# Patient Record
Sex: MALE | Race: WHITE | HISPANIC OR LATINO | Employment: FULL TIME | URBAN - METROPOLITAN AREA
[De-identification: names, ages, dates, MRNs, and addresses within clinical notes are randomized per-mention and may not be internally consistent; named-entity substitution may affect disease eponyms.]

---

## 2018-02-23 ENCOUNTER — OFFICE VISIT (OUTPATIENT)
Dept: MEDICAL GROUP | Facility: MEDICAL CENTER | Age: 38
End: 2018-02-23
Payer: COMMERCIAL

## 2018-02-23 ENCOUNTER — HOSPITAL ENCOUNTER (OUTPATIENT)
Dept: LAB | Facility: MEDICAL CENTER | Age: 38
End: 2018-02-23
Attending: NURSE PRACTITIONER
Payer: COMMERCIAL

## 2018-02-23 VITALS
DIASTOLIC BLOOD PRESSURE: 80 MMHG | TEMPERATURE: 98.9 F | BODY MASS INDEX: 36.88 KG/M2 | OXYGEN SATURATION: 94 % | HEART RATE: 77 BPM | HEIGHT: 69 IN | SYSTOLIC BLOOD PRESSURE: 110 MMHG | WEIGHT: 249 LBS

## 2018-02-23 DIAGNOSIS — F33.1 MODERATE EPISODE OF RECURRENT MAJOR DEPRESSIVE DISORDER (HCC): ICD-10-CM

## 2018-02-23 DIAGNOSIS — Z00.00 ANNUAL PHYSICAL EXAM: ICD-10-CM

## 2018-02-23 DIAGNOSIS — Z91.09 ENVIRONMENTAL ALLERGIES: ICD-10-CM

## 2018-02-23 DIAGNOSIS — E66.9 OBESITY (BMI 30-39.9): ICD-10-CM

## 2018-02-23 LAB
25(OH)D3 SERPL-MCNC: 12 NG/ML (ref 30–100)
ALBUMIN SERPL BCP-MCNC: 4.5 G/DL (ref 3.2–4.9)
ALBUMIN/GLOB SERPL: 1.2 G/DL
ALP SERPL-CCNC: 71 U/L (ref 30–99)
ALT SERPL-CCNC: 48 U/L (ref 2–50)
ANION GAP SERPL CALC-SCNC: 6 MMOL/L (ref 0–11.9)
AST SERPL-CCNC: 30 U/L (ref 12–45)
BASOPHILS # BLD AUTO: 0.6 % (ref 0–1.8)
BASOPHILS # BLD: 0.06 K/UL (ref 0–0.12)
BILIRUB SERPL-MCNC: 0.6 MG/DL (ref 0.1–1.5)
BUN SERPL-MCNC: 13 MG/DL (ref 8–22)
CALCIUM SERPL-MCNC: 9.1 MG/DL (ref 8.5–10.5)
CHLORIDE SERPL-SCNC: 103 MMOL/L (ref 96–112)
CHOLEST SERPL-MCNC: 142 MG/DL (ref 100–199)
CO2 SERPL-SCNC: 28 MMOL/L (ref 20–33)
CREAT SERPL-MCNC: 0.75 MG/DL (ref 0.5–1.4)
EOSINOPHIL # BLD AUTO: 0.14 K/UL (ref 0–0.51)
EOSINOPHIL NFR BLD: 1.4 % (ref 0–6.9)
ERYTHROCYTE [DISTWIDTH] IN BLOOD BY AUTOMATED COUNT: 38.5 FL (ref 35.9–50)
EST. AVERAGE GLUCOSE BLD GHB EST-MCNC: 108 MG/DL
GLOBULIN SER CALC-MCNC: 3.7 G/DL (ref 1.9–3.5)
GLUCOSE SERPL-MCNC: 84 MG/DL (ref 65–99)
HBA1C MFR BLD: 5.4 % (ref 0–5.6)
HCT VFR BLD AUTO: 43 % (ref 42–52)
HDLC SERPL-MCNC: 41 MG/DL
HGB BLD-MCNC: 14.6 G/DL (ref 14–18)
IMM GRANULOCYTES # BLD AUTO: 0.03 K/UL (ref 0–0.11)
IMM GRANULOCYTES NFR BLD AUTO: 0.3 % (ref 0–0.9)
LDLC SERPL CALC-MCNC: 89 MG/DL
LYMPHOCYTES # BLD AUTO: 2.11 K/UL (ref 1–4.8)
LYMPHOCYTES NFR BLD: 20.9 % (ref 22–41)
MCH RBC QN AUTO: 29.9 PG (ref 27–33)
MCHC RBC AUTO-ENTMCNC: 34 G/DL (ref 33.7–35.3)
MCV RBC AUTO: 88.1 FL (ref 81.4–97.8)
MONOCYTES # BLD AUTO: 0.5 K/UL (ref 0–0.85)
MONOCYTES NFR BLD AUTO: 5 % (ref 0–13.4)
NEUTROPHILS # BLD AUTO: 7.26 K/UL (ref 1.82–7.42)
NEUTROPHILS NFR BLD: 71.8 % (ref 44–72)
NRBC # BLD AUTO: 0 K/UL
NRBC BLD-RTO: 0 /100 WBC
PLATELET # BLD AUTO: 320 K/UL (ref 164–446)
PMV BLD AUTO: 9.9 FL (ref 9–12.9)
POTASSIUM SERPL-SCNC: 3.7 MMOL/L (ref 3.6–5.5)
PROT SERPL-MCNC: 8.2 G/DL (ref 6–8.2)
RBC # BLD AUTO: 4.88 M/UL (ref 4.7–6.1)
SODIUM SERPL-SCNC: 137 MMOL/L (ref 135–145)
TRIGL SERPL-MCNC: 58 MG/DL (ref 0–149)
TSH SERPL DL<=0.005 MIU/L-ACNC: 1.6 UIU/ML (ref 0.38–5.33)
WBC # BLD AUTO: 10.1 K/UL (ref 4.8–10.8)

## 2018-02-23 PROCEDURE — 80053 COMPREHEN METABOLIC PANEL: CPT

## 2018-02-23 PROCEDURE — 85025 COMPLETE CBC W/AUTO DIFF WBC: CPT

## 2018-02-23 PROCEDURE — 83036 HEMOGLOBIN GLYCOSYLATED A1C: CPT

## 2018-02-23 PROCEDURE — 36415 COLL VENOUS BLD VENIPUNCTURE: CPT

## 2018-02-23 PROCEDURE — 99204 OFFICE O/P NEW MOD 45 MIN: CPT | Performed by: NURSE PRACTITIONER

## 2018-02-23 PROCEDURE — 82306 VITAMIN D 25 HYDROXY: CPT

## 2018-02-23 PROCEDURE — 84443 ASSAY THYROID STIM HORMONE: CPT

## 2018-02-23 PROCEDURE — 80061 LIPID PANEL: CPT

## 2018-02-23 ASSESSMENT — PATIENT HEALTH QUESTIONNAIRE - PHQ9
SUM OF ALL RESPONSES TO PHQ QUESTIONS 1-9: 5
CLINICAL INTERPRETATION OF PHQ2 SCORE: 2
5. POOR APPETITE OR OVEREATING: 1 - SEVERAL DAYS

## 2018-02-23 NOTE — ASSESSMENT & PLAN NOTE
Patient reports having moderate amounts of stress at home due to his family living situation. He has had some depression in the past, never treated for it. He states it comes and goes depending on stress. He denies any suicidal ideation at this time.

## 2018-02-23 NOTE — PROGRESS NOTES
"Osvaldo Shaikh is a 37 y.o. male here to establish care and discuss the following:    HPI:    Moderate episode of recurrent major depressive disorder (CMS-[facility])  Patient reports having moderate amounts of stress at home due to his family living situation. He has had some depression in the past, never treated for it. He states it comes and goes depending on stress. He denies any suicidal ideation at this time.    Environmental allergies  Patient has had environmental allergies, worse in the spring and fall. He complains of itchy watery eyes, itchy nose, and sneezing attacks. He has used nasal sprays in the past without benefit as they dry his nose out. He has not used OTC allergy medications.    Current medicines (including changes today)  No current outpatient prescriptions on file.     No current facility-administered medications for this visit.      He  has a past medical history of Allergy; Anxiety; Chronic back pain; and Depression.  He  has no past surgical history on file.  Social History   Substance Use Topics   • Smoking status: Never Smoker   • Smokeless tobacco: Never Used   • Alcohol use Yes      Comment: every other friday     Social History     Social History Narrative   • No narrative on file     Family History   Problem Relation Age of Onset   • Family history unknown: Yes     No family status information on file.         ROS  No chest pain, no abdominal pain, no rash.  Positive ROS as per HPI.  All other systems reviewed and are negative      Objective:     Blood pressure 110/80, pulse 77, temperature 37.2 °C (98.9 °F), height 1.753 m (5' 9\"), weight 112.9 kg (249 lb), SpO2 94 %. Body mass index is 36.77 kg/m².     Physical Exam:    Constitutional: Alert, no distress.  Skin: Warm, dry, good turgor, no rashes in visible areas.  Eye: Equal, round and reactive, conjunctiva clear, lids normal.  ENMT: Lips without lesions, good dentition, oropharynx clear.  Neck: Trachea midline, no masses, no thyromegaly. " No cervical or supraclavicular lymphadenopathy.  Respiratory: Unlabored respiratory effort, lungs clear to auscultation, no wheezes, no ronchi.  Cardiovascular: Normal S1, S2, no murmur, no edema.  Abdomen: Soft, non-tender, no masses, no hepatosplenomegaly.  Psych: Alert and oriented x3, normal affect and mood.        Assessment and Plan:   The following treatment plan was discussed    1. Annual physical exam  Check labs, follow-up for annual.  - CBC WITH DIFFERENTIAL; Future  - COMP METABOLIC PANEL; Future  - HEMOGLOBIN A1C; Future  - LIPID PROFILE; Future  - TSH WITH REFLEX TO FT4; Future  - VITAMIN D,25 HYDROXY; Future    2. Moderate episode of recurrent major depressive disorder (CMS-HCC)  Unstable.  Advised patient to consider a family counselor for his relationship and family stressors.    3. Environmental allergies  Unstable.  Advised patient to use OTC Claritin or Zyrtec as needed for allergy symptoms.    4. Obesity (BMI 30-39.9)  Check labs.  Patient and I discussed the importance of lifestyle changes, with particular emphasis on decreasing sugar and carbohydrate intake and increasing plant-based nutrition (for the purposes of weight loss, general health, and prevention of chronic illnesses), as well as regular cardiovascular exercise, proper sleep, and stress management. Patient verbalized understanding.    - Patient identified as having weight management issue.  Appropriate orders and counseling given.        Followup: Return in about 2 weeks (around 3/9/2018) for Annual.    I have placed the below orders and discussed them with an approved delegating provider. The MA is performing the below orders under the direction of Dr. Mckeon

## 2018-02-23 NOTE — ASSESSMENT & PLAN NOTE
Patient has had environmental allergies, worse in the spring and fall. He complains of itchy watery eyes, itchy nose, and sneezing attacks. He has used nasal sprays in the past without benefit as they dry his nose out. He has not used OTC allergy medications.

## 2018-03-23 ENCOUNTER — OFFICE VISIT (OUTPATIENT)
Dept: MEDICAL GROUP | Facility: MEDICAL CENTER | Age: 38
End: 2018-03-23
Payer: COMMERCIAL

## 2018-03-23 VITALS
BODY MASS INDEX: 36.88 KG/M2 | HEIGHT: 69 IN | OXYGEN SATURATION: 96 % | TEMPERATURE: 98.5 F | HEART RATE: 76 BPM | DIASTOLIC BLOOD PRESSURE: 84 MMHG | RESPIRATION RATE: 16 BRPM | WEIGHT: 249 LBS | SYSTOLIC BLOOD PRESSURE: 126 MMHG

## 2018-03-23 DIAGNOSIS — M54.50 ACUTE MIDLINE LOW BACK PAIN WITHOUT SCIATICA: ICD-10-CM

## 2018-03-23 DIAGNOSIS — M25.511 ACUTE PAIN OF RIGHT SHOULDER: ICD-10-CM

## 2018-03-23 PROCEDURE — 99214 OFFICE O/P EST MOD 30 MIN: CPT | Performed by: NURSE PRACTITIONER

## 2018-03-23 RX ORDER — NAPROXEN 500 MG/1
500 TABLET ORAL 2 TIMES DAILY WITH MEALS
Qty: 28 TAB | Refills: 0 | Status: SHIPPED | OUTPATIENT
Start: 2018-03-23 | End: 2018-04-06

## 2018-03-23 NOTE — ASSESSMENT & PLAN NOTE
Patient started having midline low back pain over the last few weeks. He loads and unloads luggage at work. He does not recall a specific event that caused the pain to start. He denies any pain radiating down his legs. He is still able to walk normally, describes as a dull ache. Denies bowel or bladder changes or extremity numbness or weakness. He feels his low back pain may be related to his increased weight.

## 2018-03-23 NOTE — PROGRESS NOTES
"Subjective:   Osvaldo Shaikh is a 37 y.o. male here today for low back pain and right shoulder pain.    Acute midline low back pain without sciatica  Patient started having midline low back pain over the last few weeks. He loads and unloads luggage at work. He does not recall a specific event that caused the pain to start. He denies any pain radiating down his legs. He is still able to walk normally, describes as a dull ache. Denies bowel or bladder changes or extremity numbness or weakness. He feels his low back pain may be related to his increased weight.    Acute pain of right shoulder  Previously injured his shoulder at work several years ago. Did physical therapy for this and is significantly improved. He recently noticed that it has started hurting and catching again. He has not taken any medications for this.       Current medicines (including changes today)  Current Outpatient Prescriptions   Medication Sig Dispense Refill   • naproxen (NAPROSYN) 500 MG Tab Take 1 Tab by mouth 2 times a day, with meals for 14 days. 28 Tab 0     No current facility-administered medications for this visit.      He  has a past medical history of Allergy; Anxiety; Chronic back pain; and Depression.    ROS  No chest pain, no shortness of breath, no abdominal pain  Positive ROS as per HPI.  All other systems reviewed and are negative.     Objective:     Blood pressure 126/84, pulse 76, temperature 36.9 °C (98.5 °F), resp. rate 16, height 1.753 m (5' 9.02\"), weight 112.9 kg (249 lb), SpO2 96 %. Body mass index is 36.75 kg/m².     Physical Exam:  Constitutional: Alert, no distress.  Skin: Warm, dry, good turgor, no rashes in visible areas.  Eye: Equal, round and reactive, conjunctiva clear, lids normal.  ENMT: Lips without lesions, good dentition, oropharynx clear.  Neck: Trachea midline, no masses, no thyromegaly. No cervical or supraclavicular lymphadenopathy  Respiratory: Unlabored respiratory effort, lungs clear to auscultation, " no wheezes, no ronchi.  Cardiovascular: Normal S1, S2, no murmur, no edema.  Abdomen: Soft, non-tender, no masses, no hepatosplenomegaly.  Psych: Alert and oriented x3, normal affect and mood.  MSK: Tenderness to palpation of midline low back, no SI tenderness. Pain with full range of motion of her shoulder. Range of motion intact. Normal gait.    Assessment and Plan:   The following treatment plan was discussed    1. Acute midline low back pain without sciatica  Unstable.  Trial of naproxen 500 mg twice daily for 2 weeks.  Exercises and stretches given for back pain.  Advised weight loss, increasing exercise, and healthy diet.  - naproxen (NAPROSYN) 500 MG Tab; Take 1 Tab by mouth 2 times a day, with meals for 14 days.  Dispense: 28 Tab; Refill: 0    2. Acute pain of right shoulder  Unstable.  Trial of naproxen 500 mg twice daily for 2 weeks.  Advised light stretching.  - naproxen (NAPROSYN) 500 MG Tab; Take 1 Tab by mouth 2 times a day, with meals for 14 days.  Dispense: 28 Tab; Refill: 0      Followup: Return if symptoms worsen or fail to improve.    I have placed the below orders and discussed them with an approved delegating provider. The MA is performing the below orders under the direction of Dr. Mckeon

## 2018-03-23 NOTE — ASSESSMENT & PLAN NOTE
Previously injured his shoulder at work several years ago. Did physical therapy for this and is significantly improved. He recently noticed that it has started hurting and catching again. He has not taken any medications for this.

## 2018-10-18 ENCOUNTER — TELEPHONE (OUTPATIENT)
Dept: MEDICAL GROUP | Facility: PHYSICIAN GROUP | Age: 38
End: 2018-10-18

## 2018-10-19 ENCOUNTER — OFFICE VISIT (OUTPATIENT)
Dept: MEDICAL GROUP | Facility: PHYSICIAN GROUP | Age: 38
End: 2018-10-19
Payer: COMMERCIAL

## 2018-10-19 ENCOUNTER — APPOINTMENT (OUTPATIENT)
Dept: RADIOLOGY | Facility: IMAGING CENTER | Age: 38
End: 2018-10-19
Attending: INTERNAL MEDICINE
Payer: COMMERCIAL

## 2018-10-19 VITALS
DIASTOLIC BLOOD PRESSURE: 86 MMHG | TEMPERATURE: 98.9 F | BODY MASS INDEX: 37.77 KG/M2 | HEART RATE: 110 BPM | OXYGEN SATURATION: 96 % | SYSTOLIC BLOOD PRESSURE: 138 MMHG | HEIGHT: 69 IN | WEIGHT: 255 LBS

## 2018-10-19 DIAGNOSIS — G89.29 CHRONIC MIDLINE LOW BACK PAIN WITHOUT SCIATICA: ICD-10-CM

## 2018-10-19 DIAGNOSIS — E66.9 OBESITY (BMI 30-39.9): ICD-10-CM

## 2018-10-19 DIAGNOSIS — M54.50 CHRONIC MIDLINE LOW BACK PAIN WITHOUT SCIATICA: ICD-10-CM

## 2018-10-19 DIAGNOSIS — E55.9 VITAMIN D DEFICIENCY: ICD-10-CM

## 2018-10-19 DIAGNOSIS — E66.9 CLASS 2 OBESITY WITH BODY MASS INDEX (BMI) OF 37.0 TO 37.9 IN ADULT, UNSPECIFIED OBESITY TYPE, UNSPECIFIED WHETHER SERIOUS COMORBIDITY PRESENT: ICD-10-CM

## 2018-10-19 DIAGNOSIS — J06.9 VIRAL URI: ICD-10-CM

## 2018-10-19 PROBLEM — M25.511 ACUTE PAIN OF RIGHT SHOULDER: Status: RESOLVED | Noted: 2018-03-23 | Resolved: 2018-10-19

## 2018-10-19 PROBLEM — Z00.00 ANNUAL PHYSICAL EXAM: Status: RESOLVED | Noted: 2018-02-23 | Resolved: 2018-10-19

## 2018-10-19 LAB
INT CON NEG: NORMAL
INT CON POS: NORMAL
S PYO AG THROAT QL: NORMAL

## 2018-10-19 PROCEDURE — 72100 X-RAY EXAM L-S SPINE 2/3 VWS: CPT | Mod: TC | Performed by: INTERNAL MEDICINE

## 2018-10-19 PROCEDURE — 87880 STREP A ASSAY W/OPTIC: CPT | Performed by: INTERNAL MEDICINE

## 2018-10-19 PROCEDURE — 99204 OFFICE O/P NEW MOD 45 MIN: CPT | Performed by: INTERNAL MEDICINE

## 2018-10-19 RX ORDER — ERGOCALCIFEROL 1.25 MG/1
50000 CAPSULE ORAL
Qty: 12 CAP | Refills: 3 | Status: SHIPPED | OUTPATIENT
Start: 2018-10-19 | End: 2019-07-05

## 2018-10-19 RX ORDER — NAPROXEN 500 MG/1
500 TABLET ORAL 2 TIMES DAILY WITH MEALS
Qty: 30 TAB | Refills: 1 | Status: SHIPPED | OUTPATIENT
Start: 2018-10-19 | End: 2019-07-05

## 2018-10-19 NOTE — ASSESSMENT & PLAN NOTE
This is a chronic health problem that is uncontrolled with current medications and lifestyle measures. Initially started few years back with pulling heavy weight with right arm pain which resolved but now has only back pain. Currently 7/10 severity.

## 2018-10-19 NOTE — ASSESSMENT & PLAN NOTE
This is a new problem started two days back , has sick contacts at home. Endorses generalized bodycahes, no fever or chills. OTC medication Theraflu max and Tylenol which didn't help.

## 2018-10-19 NOTE — PATIENT INSTRUCTIONS
"Upper Respiratory Infection, Adult  Most upper respiratory infections (URIs) are a viral infection of the air passages leading to the lungs. A URI affects the nose, throat, and upper air passages. The most common type of URI is nasopharyngitis and is typically referred to as \"the common cold.\"  URIs run their course and usually go away on their own. Most of the time, a URI does not require medical attention, but sometimes a bacterial infection in the upper airways can follow a viral infection. This is called a secondary infection. Sinus and middle ear infections are common types of secondary upper respiratory infections.  Bacterial pneumonia can also complicate a URI. A URI can worsen asthma and chronic obstructive pulmonary disease (COPD). Sometimes, these complications can require emergency medical care and may be life threatening.  What are the causes?  Almost all URIs are caused by viruses. A virus is a type of germ and can spread from one person to another.  What increases the risk?  You may be at risk for a URI if:  · You smoke.  · You have chronic heart or lung disease.  · You have a weakened defense (immune) system.  · You are very young or very old.  · You have nasal allergies or asthma.  · You work in crowded or poorly ventilated areas.  · You work in health care facilities or schools.  What are the signs or symptoms?  Symptoms typically develop 2-3 days after you come in contact with a cold virus. Most viral URIs last 7-10 days. However, viral URIs from the influenza virus (flu virus) can last 14-18 days and are typically more severe. Symptoms may include:  · Runny or stuffy (congested) nose.  · Sneezing.  · Cough.  · Sore throat.  · Headache.  · Fatigue.  · Fever.  · Loss of appetite.  · Pain in your forehead, behind your eyes, and over your cheekbones (sinus pain).  · Muscle aches.  How is this diagnosed?  Your health care provider may diagnose a URI by:  · Physical exam.  · Tests to check that your " symptoms are not due to another condition such as:  ¨ Strep throat.  ¨ Sinusitis.  ¨ Pneumonia.  ¨ Asthma.  How is this treated?  A URI goes away on its own with time. It cannot be cured with medicines, but medicines may be prescribed or recommended to relieve symptoms. Medicines may help:  · Reduce your fever.  · Reduce your cough.  · Relieve nasal congestion.  Follow these instructions at home:  · Take medicines only as directed by your health care provider.  · Gargle warm saltwater or take cough drops to comfort your throat as directed by your health care provider.  · Use a warm mist humidifier or inhale steam from a shower to increase air moisture. This may make it easier to breathe.  · Drink enough fluid to keep your urine clear or pale yellow.  · Eat soups and other clear broths and maintain good nutrition.  · Rest as needed.  · Return to work when your temperature has returned to normal or as your health care provider advises. You may need to stay home longer to avoid infecting others. You can also use a face mask and careful hand washing to prevent spread of the virus.  · Increase the usage of your inhaler if you have asthma.  · Do not use any tobacco products, including cigarettes, chewing tobacco, or electronic cigarettes. If you need help quitting, ask your health care provider.  How is this prevented?  The best way to protect yourself from getting a cold is to practice good hygiene.  · Avoid oral or hand contact with people with cold symptoms.  · Wash your hands often if contact occurs.  There is no clear evidence that vitamin C, vitamin E, echinacea, or exercise reduces the chance of developing a cold. However, it is always recommended to get plenty of rest, exercise, and practice good nutrition.  Contact a health care provider if:  · You are getting worse rather than better.  · Your symptoms are not controlled by medicine.  · You have chills.  · You have worsening shortness of breath.  · You have brown  or red mucus.  · You have yellow or brown nasal discharge.  · You have pain in your face, especially when you bend forward.  · You have a fever.  · You have swollen neck glands.  · You have pain while swallowing.  · You have white areas in the back of your throat.  Get help right away if:  · You have severe or persistent:  ¨ Headache.  ¨ Ear pain.  ¨ Sinus pain.  ¨ Chest pain.  · You have chronic lung disease and any of the following:  ¨ Wheezing.  ¨ Prolonged cough.  ¨ Coughing up blood.  ¨ A change in your usual mucus.  · You have a stiff neck.  · You have changes in your:  ¨ Vision.  ¨ Hearing.  ¨ Thinking.  ¨ Mood.  This information is not intended to replace advice given to you by your health care provider. Make sure you discuss any questions you have with your health care provider.  Document Released: 06/13/2002 Document Revised: 08/20/2017 Document Reviewed: 03/25/2015  [employer] Patient Education © 2017 [employer]

## 2018-10-19 NOTE — PROGRESS NOTES
CC:  New patient, sore throat.    HISTORY OF THE PRESENT ILLNESS: Patient is a 37 y.o. male. This pleasant patient is here today to discuss her medical conditions as mentioned in history of present illness below.    Health Maintenance: Due for flu vaccine which she will take after the resolution of this acute episode.      Viral URI  This is a new problem started two days back , has sick contacts at home. Endorses generalized bodycahes, no fever or chills. OTC medication Theraflu max and Tylenol which didn't help.     Chronic midline low back pain without sciatica  This is a chronic health problem that is uncontrolled with current medications and lifestyle measures. Initially started few years back with pulling heavy weight with right arm pain which resolved but now has only back pain. Currently 7/10 severity.    Vitamin D deficiency  This is a chronic health problem that is uncontrolled with current medications and lifestyle measures. Last vitamin D check in several 2018 was showing low at 12, not on any medications.    PHQ score 0, BMI > 37 refusing health improvement program at this time, no tobacco, no fall injuries    Allergies: Patient has no known allergies.    Current Outpatient Prescriptions Ordered in [facility]   Medication Sig Dispense Refill   • vitamin D, Ergocalciferol, (DRISDOL) 10920 units Cap capsule Take 1 Cap by mouth every 7 days. 12 Cap 3   • naproxen (NAPROSYN) 500 MG Tab Take 1 Tab by mouth 2 times a day, with meals. 30 Tab 1     No current Epic-ordered facility-administered medications on file.        Past Medical History:   Diagnosis Date   • Allergy    • Anxiety    • Chronic back pain    • Depression        History reviewed. No pertinent surgical history.    Social History   Substance Use Topics   • Smoking status: Never Smoker   • Smokeless tobacco: Never Used   • Alcohol use Yes      Comment: every other friday       Social History     Social History Narrative   • No narrative on file  "      Family History   Problem Relation Age of Onset   • Family history unknown: Yes       ROS:     - Constitutional: Negative for fever, chills, unexpected weight change, and fatigue/generalized weakness.     - HEENT positive for rhinorrhea, sore throat: Negative for headaches, vision changes, hearing changes, ear pain, ear discharge, sinus congestion,and neck pain.      - Respiratory: Negative for cough, sputum production, chest congestion, dyspnea, wheezing, and crackles.      - Cardiovascular: Negative for chest pain, palpitations, orthopnea, and bilateral lower extremity edema.     - Gastrointestinal: Negative for heartburn, nausea, vomiting, abdominal pain, hematochezia, melena, diarrhea, constipation, and greasy/foul-smelling stools.     - Genitourinary: Negative for dysuria, polyuria, hematuria, pyuria, urinary urgency, and urinary incontinence.     - Musculoskeletal: Positive for low back pain Negative for myalgias and joint pain.     - Skin: Negative for rash, itching, cyanotic skin color change.     - Neurological: Negative for dizziness, tingling, tremors, focal sensory deficit, focal weakness and headaches.     - Endo/Heme/Allergies: Does not bruise/bleed easily.     - Psychiatric/Behavioral: Negative for depression, suicidal/homicidal ideation and memory loss.      Last lab work in February 2018 reviewed and discussed with the patient.    Exam: Blood pressure 138/86, pulse (!) 110, temperature 37.2 °C (98.9 °F), temperature source Temporal, height 1.753 m (5' 9.02\"), weight 115.7 kg (255 lb), SpO2 96 %. Body mass index is 37.64 kg/m².    General: Normal appearing. No distress.  HEENT: Normocephalic. Eyes conjunctiva clear lids without ptosis, pupils equal and reactive to light accommodation, ears normal shape and contour, canals are clear bilaterally, tympanic membranes are benign, nasal mucosa benign, oropharynx is without erythema, edema or exudates.   Neck: Supple without JVD or bruit. Thyroid is " not enlarged.  Pulmonary: Clear to ausculation.  Normal effort. No rales, ronchi, or wheezing.  Cardiovascular: Regular rate and rhythm without murmur. Carotid and radial pulses are intact and equal bilaterally.  Abdomen: Soft, nontender, nondistended. Normal bowel sounds. Liver and spleen are not palpable  Neurologic: Grossly nonfocal  Lymph: No cervical, supraclavicular or axillary lymph nodes are palpable  Skin: Warm and dry.  No obvious lesions.  Musculoskeletal: Normal gait. No extremity cyanosis, clubbing, or edema.  Psych: Normal mood and affect. Alert and oriented x3. Judgment and insight is normal.      Please note that this dictation was created using voice recognition software. I have made every reasonable attempt to correct obvious errors, but I expect that there are errors of grammar and possibly content that I did not discover before finalizing the note.      Assessment/Plan  1. Viral URI  New problem, we will do a rapid strep test . Per my clinical exam it looks like viral etiology, given instructions to take copious amounts of fluids and Tylenol for body pains and inform us if it does not come down in next 1 week.  - POCT Rapid Strep A    2. Chronic midline low back pain without sciatica  Uncontrolled, we will do an imaging on the lumbar spine, start on naproxen 500 mg twice a day when necessary for pain, referral to physical therapy given.  - DX-LUMBAR SPINE-2 OR 3 VIEWS; Future  - naproxen (NAPROSYN) 500 MG Tab; Take 1 Tab by mouth 2 times a day, with meals.  Dispense: 30 Tab; Refill: 1  - REFERRAL TO PHYSICAL THERAPY Reason for Therapy: Eval/Treat/Report    3. Obesity (BMI 30-39.9)  4. Class 2 obesity with body mass index (BMI) of 37.0 to 37.9 in adult, unspecified obesity type, unspecified whether serious comorbidity present  Pt educated on the increase of morbidity and mortality associated with excess weight including DM, Heart Disease, HTN, stroke, and sleep apnea.  Pt advised weight loss of 5%  through reduced calorie, low fat diet and 150 mins of exercise a week  Recommend obesity clinic if patient is unsuccessful with weight loss    5. Vitamin D deficiency  Uncontrolled, last checked in February 2018 but not on any medication. We will give him high-dose vitamin D 50,000 units every weekly.  - vitamin D, Ergocalciferol, (DRISDOL) 77970 units Cap capsule; Take 1 Cap by mouth every 7 days.  Dispense: 12 Cap; Refill: 3

## 2018-12-13 ENCOUNTER — TELEPHONE (OUTPATIENT)
Dept: MEDICAL GROUP | Facility: PHYSICIAN GROUP | Age: 38
End: 2018-12-13

## 2018-12-13 NOTE — TELEPHONE ENCOUNTER
Future Appointments       Provider Department Center    12/14/2018 11:00 AM Geetha Velasquez M.D. [facility]        ESTABLISHED PATIENT PRE-VISIT PLANNING     Patient was contacted to complete PVP.     Note: Patient will not be contacted if there is no indication to call.     1.  Reviewed notes from the last few office visits within the medical group: Yes    2.  If any orders were placed at last visit or intended to be done for this visit (i.e. 6 mos follow-up), do we have Results/Consult Notes?        •  Labs - Labs were not ordered at last office visit.       •  Imaging - Imaging was not ordered at last office visit.       •  Referrals - Referral ordered, patient has NOT been seen. Pt was a no show to the physical therapy referral     3. Is this appointment scheduled as a Hospital Follow-Up? No    4.  Immunizations were updated in Epic using WebIZ?: No WebIZ record       •  Web Iz Recommendations: FLU and TDAP    5.  Patient is due for the following Health Maintenance Topics:   Health Maintenance Due   Topic Date Due   • IMM DTaP/Tdap/Td Vaccine (1 - Tdap) 12/16/1999   • IMM INFLUENZA (1) 09/01/2018       6.  MDX printed for Provider? NO    7.  Patient was informed to arrive 15 min prior to their scheduled appointment and bring in their medication bottles.

## 2018-12-14 ENCOUNTER — APPOINTMENT (OUTPATIENT)
Dept: MEDICAL GROUP | Facility: PHYSICIAN GROUP | Age: 38
End: 2018-12-14
Payer: COMMERCIAL

## 2019-02-08 ENCOUNTER — OFFICE VISIT (OUTPATIENT)
Dept: MEDICAL GROUP | Facility: PHYSICIAN GROUP | Age: 39
End: 2019-02-08
Payer: COMMERCIAL

## 2019-02-08 VITALS
SYSTOLIC BLOOD PRESSURE: 128 MMHG | WEIGHT: 260 LBS | HEART RATE: 85 BPM | DIASTOLIC BLOOD PRESSURE: 70 MMHG | TEMPERATURE: 97.4 F | HEIGHT: 69 IN | BODY MASS INDEX: 38.51 KG/M2

## 2019-02-08 DIAGNOSIS — E55.9 VITAMIN D DEFICIENCY: ICD-10-CM

## 2019-02-08 DIAGNOSIS — E66.9 OBESITY (BMI 30-39.9): ICD-10-CM

## 2019-02-08 DIAGNOSIS — E66.9 CLASS 2 OBESITY WITH BODY MASS INDEX (BMI) OF 38.0 TO 38.9 IN ADULT, UNSPECIFIED OBESITY TYPE, UNSPECIFIED WHETHER SERIOUS COMORBIDITY PRESENT: ICD-10-CM

## 2019-02-08 DIAGNOSIS — Z23 NEED FOR VACCINATION: ICD-10-CM

## 2019-02-08 DIAGNOSIS — M54.50 CHRONIC MIDLINE LOW BACK PAIN WITHOUT SCIATICA: ICD-10-CM

## 2019-02-08 DIAGNOSIS — F33.1 MODERATE EPISODE OF RECURRENT MAJOR DEPRESSIVE DISORDER (HCC): ICD-10-CM

## 2019-02-08 DIAGNOSIS — G89.29 CHRONIC MIDLINE LOW BACK PAIN WITHOUT SCIATICA: ICD-10-CM

## 2019-02-08 PROCEDURE — 90471 IMMUNIZATION ADMIN: CPT | Performed by: INTERNAL MEDICINE

## 2019-02-08 PROCEDURE — 90472 IMMUNIZATION ADMIN EACH ADD: CPT | Performed by: INTERNAL MEDICINE

## 2019-02-08 PROCEDURE — 90715 TDAP VACCINE 7 YRS/> IM: CPT | Performed by: INTERNAL MEDICINE

## 2019-02-08 PROCEDURE — 99214 OFFICE O/P EST MOD 30 MIN: CPT | Mod: 25 | Performed by: INTERNAL MEDICINE

## 2019-02-08 PROCEDURE — 90686 IIV4 VACC NO PRSV 0.5 ML IM: CPT | Performed by: INTERNAL MEDICINE

## 2019-02-08 RX ORDER — LIDOCAINE 4 G/G
1 PATCH TOPICAL
Qty: 10 PATCH | Refills: 2 | Status: SHIPPED | OUTPATIENT
Start: 2019-02-08 | End: 2019-07-05

## 2019-02-08 RX ORDER — TIZANIDINE 4 MG/1
4 TABLET ORAL 3 TIMES DAILY PRN
Qty: 30 TAB | Refills: 1 | Status: SHIPPED | OUTPATIENT
Start: 2019-02-08 | End: 2019-07-05

## 2019-02-08 NOTE — ASSESSMENT & PLAN NOTE
This is a chronic health problem that is well controlled with current medications and lifestyle measures.  Patient refuses to say that he has depression at all accepts work-related stress.  Not on any medications. On Lifestyle modification.

## 2019-02-08 NOTE — PROGRESS NOTES
CC: Follow-up visit, back pain.    HISTORY OF PRESENT ILLNESS: Patient is a 38 y.o. male established patient who presents today to discuss on medical conditions as mentioned in HPI below.    Health Maintenance: Due for flu vaccine and tetanus vaccine which is okay to get in the office today.    Chronic midline low back pain without sciatica  This is a chronic health problem that is uncontrolled with current medications and lifestyle measures.Patient took Naproxen but did not do the PT referral given last time.Pt doesn't remember where his previous MRI was done. It was normal at that ttime as per the patient.    Obesity (BMI 30-39.9)  This is a chronic health problem that is uncontrolled with current medications and lifestyle measures.  Patient requesting for help as he is gaining weight, discussed at length on the [facility] improvement program which he was agreeable.    Moderate episode of recurrent major depressive disorder (CMS-HCC) (HCC)  This is a chronic health problem that is well controlled with current medications and lifestyle measures.  Patient refuses to say that he has depression at all accepts work-related stress.  Not on any medications. On Lifestyle modification.      PHQ score 0, BMI > 38 , no tobacco, no fall injuries.    Patient Active Problem List    Diagnosis Date Noted   • Viral URI 10/19/2018   • Chronic midline low back pain without sciatica 10/19/2018   • Vitamin D deficiency 10/19/2018   • Moderate episode of recurrent major depressive disorder (HCC) 02/23/2018   • Environmental allergies 02/23/2018   • Obesity (BMI 30-39.9) 02/23/2018      Allergies:Patient has no known allergies.    Current Outpatient Prescriptions   Medication Sig Dispense Refill   • tizanidine (ZANAFLEX) 4 MG Tab Take 1 Tab by mouth 3 times a day as needed. 30 Tab 1   • Lidocaine 4 % Patch 1 Patch by Apply externally route 1 time daily as needed. 10 Patch 2   • vitamin D, Ergocalciferol, (DRISDOL) 94674 units Cap  "capsule Take 1 Cap by mouth every 7 days. 12 Cap 3   • naproxen (NAPROSYN) 500 MG Tab Take 1 Tab by mouth 2 times a day, with meals. 30 Tab 1     No current facility-administered medications for this visit.        Social History   Substance Use Topics   • Smoking status: Never Smoker   • Smokeless tobacco: Never Used   • Alcohol use Yes      Comment: every other friday     Social History     Social History Narrative   • No narrative on file       Family History   Problem Relation Age of Onset   • Family history unknown: Yes        ROS:     - Constitutional:  Negative for fever, chills, unexpected weight change, and fatigue/generalized weakness.    - HEENT:  Negative for headaches, vision changes, hearing changes, ear pain, ear discharge, rhinorrhea, sinus congestion, sore throat, and neck pain.      - Respiratory: Negative for cough, sputum production, chest congestion, dyspnea, wheezing, and crackles.      - Cardiovascular: Negative for chest pain, palpitations, orthopnea, and bilateral lower extremity edema.     - Gastrointestinal: Negative for heartburn, nausea, vomiting, abdominal pain, hematochezia, melena, diarrhea, constipation, and greasy/foul-smelling stools.     - Genitourinary: Negative for dysuria, polyuria, hematuria, pyuria, urinary urgency, and urinary incontinence.     - Musculoskeletal: As mentioned in HPI above negative for myalgias and joint pain.     - Skin: Negative for rash, itching, cyanotic skin color change.     - Neurological: Negative for dizziness, tingling, tremors, focal sensory deficit, focal weakness and headaches.     - Endo/Heme/Allergies: Does not bruise/bleed easily.     - Psychiatric/Behavioral: Negative for depression, suicidal/homicidal ideation and memory loss.        Last lab work done in February 2018 reviewed and discussed with the patient.    Exam:    Blood pressure 128/70, pulse 85, temperature 36.3 °C (97.4 °F), temperature source Temporal, height 1.753 m (5' 9.02\"), " weight 117.9 kg (260 lb). Body mass index is 38.37 kg/m².    General:  Well nourished, well developed male in NAD  Head is grossly normal.  Neck: Supple without JVD or bruit. Thyroid is not enlarged.  Pulmonary: Clear to ausculation and percussion.  Normal effort. No rales, ronchi, or wheezing.  Cardiovascular: Regular rate and rhythm without murmur. Carotid and radial pulses are intact and equal bilaterally.  Extremities: no clubbing, cyanosis, or edema.  Back exam : Mild tenderness on palpation of lumbar spine, no paraspinal tenderness.  Straight leg raising test negative on both sides.    Please note that this dictation was created using voice recognition software. I have made every reasonable attempt to correct obvious errors, but I expect that there are errors of grammar and possibly content that I did not discover before finalizing the note.    Assessment/Plan:  1. Chronic midline low back pain without sciatica  Uncontrolled, patient has not kept up the physical therapy referral given in his last visit in October 2018.  Reviewed his lumbar spine x-ray which was done at that time within normal limits.  Patient does states that he got a previous MRI which was normal around 10 years back.  I do not suspect that he has any foraminal stenosis versus sciatica which was all negative on my physical exam as mentioned above.  Given instructions to keep up the physical therapy referral given so that I can plan on next steps which he understood and agreed.  Will give some more symptomatic treatment at this time including Zanaflex as needed and also lidocaine patches while at work.  - tizanidine (ZANAFLEX) 4 MG Tab; Take 1 Tab by mouth 3 times a day as needed.  Dispense: 30 Tab; Refill: 1  - Lidocaine 4 % Patch; 1 Patch by Apply externally route 1 time daily as needed.  Dispense: 10 Patch; Refill: 2    2. Obesity (BMI 30-39.9)  3. Class 2 obesity with body mass index (BMI) of 38.0 to 38.9 in adult, unspecified obesity  "type, unspecified whether serious comorbidity present  Pt educated on the increase of morbidity and mortality associated with excess weight including DM, Heart Disease, HTN, stroke, and sleep apnea.  Pt advised weight loss of 5% through reduced calorie, low fat diet and 150 mins of exercise a week  Recommend obesity clinic if patient is unsuccessful with weight loss  - REFERRAL TO [facility] anywayanyday IMPROVEMENT PROGRAMS (HIP) Services Requested: Physician Medical Weight Management Program; Reason for Referral? Waist Circumference>40\"; Reason for Visit: Overweight/Obesity  - TSH WITH REFLEX TO FT4; Future    4. Need for vaccination  - Tdap =>6yo IM  - Flu Quad Inj >3 Year Pre-Filled PF    5. Vitamin D deficiency  Last lab work included 2018 showing vitamin D deficiency, will recheck and replete if needed.  To continue current vitamin D 50,000 units q. weekly at this time.  - VITAMIN D,25 HYDROXY; Future      6. Moderate episode of recurrent major depressive disorder (HCC)  Stable, patient denies any depression when questioned to him.  To continue current lifestyle modification and emphasized on doing moderate exercise to come over this.  Will reevaluate again in his next visit.    "

## 2019-02-08 NOTE — ASSESSMENT & PLAN NOTE
This is a chronic health problem that is uncontrolled with current medications and lifestyle measures.Patient took Naproxen but did not do the PT referral given last time.Pt doesn't remember where his previous MRI was done. It was normal at that ttime as per the patient.

## 2019-02-08 NOTE — ASSESSMENT & PLAN NOTE
This is a chronic health problem that is uncontrolled with current medications and lifestyle measures.  Patient requesting for help as he is gaining weight, discussed at length on the [facility] improvement program which he was agreeable.

## 2019-02-26 DIAGNOSIS — M54.50 CHRONIC MIDLINE LOW BACK PAIN WITHOUT SCIATICA: ICD-10-CM

## 2019-02-26 DIAGNOSIS — G89.29 CHRONIC MIDLINE LOW BACK PAIN WITHOUT SCIATICA: ICD-10-CM

## 2019-03-29 ENCOUNTER — PHYSICAL THERAPY (OUTPATIENT)
Dept: PHYSICAL THERAPY | Facility: REHABILITATION | Age: 39
End: 2019-03-29
Attending: INTERNAL MEDICINE
Payer: COMMERCIAL

## 2019-03-29 DIAGNOSIS — G89.29 CHRONIC MIDLINE LOW BACK PAIN WITHOUT SCIATICA: ICD-10-CM

## 2019-03-29 DIAGNOSIS — M54.50 CHRONIC MIDLINE LOW BACK PAIN WITHOUT SCIATICA: ICD-10-CM

## 2019-03-29 PROCEDURE — 97014 ELECTRIC STIMULATION THERAPY: CPT

## 2019-03-29 PROCEDURE — 97110 THERAPEUTIC EXERCISES: CPT

## 2019-03-29 PROCEDURE — 97162 PT EVAL MOD COMPLEX 30 MIN: CPT

## 2019-03-29 ASSESSMENT — ENCOUNTER SYMPTOMS
PAIN SCALE AT HIGHEST: 8
PAIN SCALE AT LOWEST: 0
PAIN SCALE: 7

## 2019-03-29 NOTE — OP THERAPY EVALUATION
Outpatient Physical Therapy  INITIAL EVALUATION    [facility] [address]  [address]  [place] [address]  Phone:  [phone]  Fax:  [phone]    Date of Evaluation: 2019    Patient: Osvaldo Shaikh  YOB: 1980  MRN: [id number]     Referring Provider: Geetha Velasquez M.D.  [address] [place] [address] [address]  [place] [address]   Referring Diagnosis No admission diagnoses are documented for this encounter.     Time Calculation  Start time: 245  Stop time: 350 Time Calculation (min): 65 minutes     Physical Therapy Occurrence Codes    Date of onset of impairment:  19   Date physical therapy care plan established or reviewed:  3/29/19   Date physical therapy treatment started:  3/29/19          Chief Complaint: No chief complaint on file.    Visit Diagnoses     ICD-10-CM   1. 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 symptoms    Aggravating factors:  Wash dishes  Give 3 y.o. Son a bath  Sit > 30'         Past Medical History:   Diagnosis Date   • Allergy    • Anxiety    • Chronic back pain    • Depression      No past surgical history on file.    Precautions:       Objective   Observation and functional movement:  Decreased lordosis    Sensation and reflexes:     Not tested due to denial of l.e. Symptoms  Cough-- not worse    Palpation and joint mobility:     ttp p/a l2-5--noted paraspinal guarding            Therapeutic Treatments and Modalities:     Therapeutic Treatment and Modalities Summary: Core stab level I w/  "BFB:   --ball in/out   --marching  --ball bridges 30\" x 3  REIL//centralized  SEIL with Indonesian to l/s x 15 w/ 5/5\" and mhp  //4/10    Manual therapy minutes (CPT 72992): 2 minutes  Therapeutic exercise minutes (CPT 29193): 15 minutes       Assessment, Response and Plan:   Assessment details:  Patient presents with L/S derangement  syndrome with  poor posture  and poor body awareness with decreased lordosis.  Patient presents with axial load and flexion sensitivities. Patient centralized with Gagan extension protocol. Patient should do well if compliant with POC.  Barriers to therapy:  None  Prognosis: good    Goals:   Short Term Goals:   Sit more than 1 hr w/o pain  Wash dishes for 5' w/o pain  Bathe son w/o pain  RMQ <5/24      Long Term Goals:    Sit more than 1 hr w/o pain  Wash dishes for 20' w/o pain  Lift 30lbs off of floor w/o pain  RMQ <3/24    Plan:   Therapy options:  Physical therapy treatment to continue  Planned therapy interventions:  Therapeutic Exercise (CPT 32793), E Stim Unattended (CPT 14736), Manual Therapy (CPT 75375) and Mechanical Traction (CPT 75686)  Frequency:  2x week  Duration in weeks:  8  Duration in visits:  10  Plan details:  Core stab, traction , gagan progression      Functional Limitations and Severity Modifiers  Aden Trevon Low Back Pain and Disability Score: 29.17     Referring provider co-signature:  I have reviewed this plan of care and my co-signature certifies the need for services.      Physician Signature: ________________________________ Date: ______________     "

## 2019-04-03 ENCOUNTER — APPOINTMENT (OUTPATIENT)
Dept: PHYSICAL THERAPY | Facility: REHABILITATION | Age: 39
End: 2019-04-03
Payer: COMMERCIAL

## 2019-04-04 ENCOUNTER — OFFICE VISIT (OUTPATIENT)
Dept: HEALTH INFORMATION MANAGEMENT | Facility: MEDICAL CENTER | Age: 39
End: 2019-04-04
Payer: COMMERCIAL

## 2019-04-04 VITALS
SYSTOLIC BLOOD PRESSURE: 124 MMHG | HEART RATE: 82 BPM | OXYGEN SATURATION: 94 % | WEIGHT: 256.5 LBS | DIASTOLIC BLOOD PRESSURE: 80 MMHG | HEIGHT: 69 IN | BODY MASS INDEX: 37.99 KG/M2

## 2019-04-04 DIAGNOSIS — E66.9 OBESITY (BMI 30-39.9): ICD-10-CM

## 2019-04-04 DIAGNOSIS — R53.82 CHRONIC FATIGUE: ICD-10-CM

## 2019-04-04 DIAGNOSIS — E55.9 VITAMIN D DEFICIENCY: ICD-10-CM

## 2019-04-04 DIAGNOSIS — F33.1 MODERATE EPISODE OF RECURRENT MAJOR DEPRESSIVE DISORDER (HCC): ICD-10-CM

## 2019-04-04 DIAGNOSIS — R63.2 BINGE EATING: ICD-10-CM

## 2019-04-04 PROCEDURE — 99204 OFFICE O/P NEW MOD 45 MIN: CPT | Performed by: INTERNAL MEDICINE

## 2019-04-04 PROCEDURE — 93000 ELECTROCARDIOGRAM COMPLETE: CPT | Performed by: INTERNAL MEDICINE

## 2019-04-04 PROCEDURE — 97802 MEDICAL NUTRITION INDIV IN: CPT | Performed by: DIETITIAN, REGISTERED
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
## 2019-04-04 NOTE — PROGRESS NOTES
"4/4/2019   Referring Provider: GRECIA Nicole 38 y.o.        Time in/out: 1:17-1:36    Anthropometrics/Objective  Vitals:    04/04/19 1239   BP: 124/80   Weight: 116.3 kg (256 lb 8 oz)   Height: 1.753 m (5' 9\")     BMI: Body mass index is 37.88 kg/m².  Stated Goal Weight: 180-200 lb  See comprehensive patient history form for further information     Subjective:  Pt is here today for the initial screening visit for the medical weight management program.      - Wants to make a change to his diet and lifestyle to improve his health now that he has a young child   - Knows what he is currently doing isn't healthy and knows some of the things he could do to eat healthier, but struggles with actually doing it   - Not ready for drastic change   - No tracking   - Already uses a shake some mornings for breakfast or a granola bar   - Does like frozen veggies   - Has a sweet tooth   - Feels his worst habit is fast food which he primarily eats for dinner    See Medical Questionnaire for more detailed diet history     B - Breakfast essentials shake or [employer] granola bar  L - Frozen entree  S - candy, cookies, cake, chips  D - quesidilla, cheese sandwich or fast food  Drinks: water, trying to drink more of this, coffee w/ ~6 sugars and cream    Nutrition Diagnosis (PES Statement)  · Obesity related to excessive energy intake and inadequate energy expenditure as evidenced by BMI >30     Client history:  Condition(s) associated with a diagnosis or treatment (specify) VDD, moderate MDD    Biochemical data, medical test and procedures  Lab Results   Component Value Date/Time    HBA1C 5.4 02/23/2018 11:13 AM     No results found for: POCGLUCOSE  Lab Results   Component Value Date/Time    CHOLSTRLTOT 142 02/23/2018 11:13 AM    LDL 89 02/23/2018 11:13 AM    HDL 41 02/23/2018 11:13 AM    TRIGLYCERIDE 58 02/23/2018 11:13 AM         Nutrition Intervention  Nutrition Prescription  Recommended Daily Kcals: " ~1900 Kcal based on REE of 2209   Recommended Daily Protein: 100g or more per day per Dr. Clifford or 35%  Recommended Daily CHO: 100g or less per day per Dr. Clifford or 30%    Meal Plan Recommendation   Low calorie, high protein/low CHO diet with 1 meal replacements per day per Dr. Clifford (Quest bar or Premier Protein)    Comprehensive Nutrition Education Instruction or training leading to in-depth nutrition related knowledge about:   Benefits to following meal plan, Combine carb, protein and fat at each meal,  Meal timing and spacing, Portion control, Sweets and alcohol in moderation     Handouts provided regarding topics discussed:   - Goal sheet   - MyPlate   - Snack list w/ fruit    Monitoring & Evaluation Plan    Behavioral-Environmental:  Behavior: Keep a food journal and bring to next appointment   Physical activity: not discussed today    Food / Nutrient Intake:  Food intake: Follow meal plan as discussed. Avoid concentrated sweets and processed carbs. Use the plate method for portion control and macronutrient balance. Limit carbs/starch to up to 1/4th plate per meal. Snacks should be 1oz protein + ns veggies or fruit. Fruit once per day.     Fluid intake: Consume at least 64 oz water per day. Avoid all sweetened beverages. Limit coffee to 1 cup a day (ideally no cream or sugar). Avoid alcohol.      Physical Signs / Symptoms:  Weight loss towards BMI < 30    Assessment Notes:  Today I oriented Osvaldo to Dr. Clifford's Medical Weight Management program. He may have problems with scheduling follow-ups due to work hours.  Discussed higher protein, lower CHO and calorie controlled meal plan, optional but encouraged use of meal replacements, 3 meals and 2 snacks per day as well as calorie/macro tracking. He will be using meal replacements for breakfast. We discussed how to build protein into each meal and snack, incorporating 1/2 plate non-starchy vegetable twice daily, fruit 1 x day, optional 1/4 plate  of complex CHO at meals if desired, avoiding trigger foods and snacks consisting of protein and optional non-starchy vegetable or serving of fruit.     He will not be tracking at this time.     F/U: 1 month with MD and NAUN

## 2019-05-03 ENCOUNTER — APPOINTMENT (OUTPATIENT)
Dept: PHYSICAL THERAPY | Facility: REHABILITATION | Age: 39
End: 2019-05-03
Attending: INTERNAL MEDICINE
Payer: COMMERCIAL

## 2019-05-03 DIAGNOSIS — M54.50 CHRONIC MIDLINE LOW BACK PAIN WITHOUT SCIATICA: ICD-10-CM

## 2019-05-03 DIAGNOSIS — G89.29 CHRONIC MIDLINE LOW BACK PAIN WITHOUT SCIATICA: ICD-10-CM

## 2019-05-03 PROCEDURE — 97110 THERAPEUTIC EXERCISES: CPT

## 2019-05-03 NOTE — OP THERAPY DAILY TREATMENT
Outpatient Physical Therapy  DAILY TREATMENT     [facility] [address].  [address]  [place] [address]  Phone:  [phone]  Fax:  [phone]    Date: 05/03/2019    Patient: Osvaldo Shaikh  YOB: 1980  MRN: [id number]     Time Calculation  Start time: 1202  Stop time: 1248 Time Calculation (min): 46 minutes     Chief Complaint: Back Problem    Visit #: 2    SUBJECTIVE:  I am okay    OBJECTIVE:    Therapeutic Exercises (CPT 37845):     1. Bridge on swiss ball , HEP     2. Swiss ball curls , HEP     3. Bridge plus hamstring curls , HEP     4. Side plank , modified position due to weakness HEP     5. Supermans , HEP       Therapeutic Exercise Summary: Education regarding sitting and standing posture    Therapeutic Treatments and Modalities:     1. Mechanical Traction (CPT 70446), 105#/70  60/20 sec with heat x 15 min    Time-based treatments/modalities:  Therapeutic exercise minutes (CPT 09687): 25 minutes       ASSESSMENT:   Response to treatment: Patient demonstrates significant lumbar and abdominal weakness and poor postural awrenss    PLAN/RECOMMENDATIONS:   Plan for treatment: therapy treatment to continue next visit.  Planned interventions for next visit: continue with current treatment.

## 2019-05-10 ENCOUNTER — APPOINTMENT (OUTPATIENT)
Dept: PHYSICAL THERAPY | Facility: REHABILITATION | Age: 39
End: 2019-05-10
Attending: INTERNAL MEDICINE
Payer: COMMERCIAL

## 2019-07-05 ENCOUNTER — OFFICE VISIT (OUTPATIENT)
Dept: MEDICAL GROUP | Facility: PHYSICIAN GROUP | Age: 39
End: 2019-07-05
Payer: COMMERCIAL

## 2019-07-05 ENCOUNTER — APPOINTMENT (OUTPATIENT)
Dept: RADIOLOGY | Facility: IMAGING CENTER | Age: 39
End: 2019-07-05
Attending: INTERNAL MEDICINE
Payer: COMMERCIAL

## 2019-07-05 VITALS
BODY MASS INDEX: 37.77 KG/M2 | WEIGHT: 255 LBS | HEIGHT: 69 IN | DIASTOLIC BLOOD PRESSURE: 74 MMHG | SYSTOLIC BLOOD PRESSURE: 118 MMHG | OXYGEN SATURATION: 97 % | HEART RATE: 72 BPM | TEMPERATURE: 97.9 F

## 2019-07-05 DIAGNOSIS — J30.2 SEASONAL ALLERGIES: ICD-10-CM

## 2019-07-05 DIAGNOSIS — R42 DIZZINESS: ICD-10-CM

## 2019-07-05 DIAGNOSIS — J06.9 VIRAL URI WITH COUGH: ICD-10-CM

## 2019-07-05 DIAGNOSIS — E66.9 CLASS 2 OBESITY WITH BODY MASS INDEX (BMI) OF 37.0 TO 37.9 IN ADULT, UNSPECIFIED OBESITY TYPE, UNSPECIFIED WHETHER SERIOUS COMORBIDITY PRESENT: ICD-10-CM

## 2019-07-05 DIAGNOSIS — J20.9 ACUTE BRONCHITIS, UNSPECIFIED ORGANISM: ICD-10-CM

## 2019-07-05 DIAGNOSIS — E55.9 VITAMIN D DEFICIENCY: ICD-10-CM

## 2019-07-05 PROCEDURE — 71046 X-RAY EXAM CHEST 2 VIEWS: CPT | Mod: TC | Performed by: INTERNAL MEDICINE

## 2019-07-05 PROCEDURE — 99214 OFFICE O/P EST MOD 30 MIN: CPT | Performed by: INTERNAL MEDICINE

## 2019-07-05 RX ORDER — AZITHROMYCIN 250 MG/1
TABLET, FILM COATED ORAL
Qty: 6 TAB | Refills: 0 | Status: SHIPPED | OUTPATIENT
Start: 2019-07-05 | End: 2019-11-01

## 2019-07-05 RX ORDER — LORATADINE 10 MG/1
10 TABLET ORAL DAILY
Qty: 90 TAB | Refills: 1 | Status: SHIPPED | OUTPATIENT
Start: 2019-07-05 | End: 2019-11-04 | Stop reason: SDUPTHER

## 2019-07-05 RX ORDER — GUAIFENESIN AND DEXTROMETHORPHAN HYDROBROMIDE 100; 10 MG/5ML; MG/5ML
10 SOLUTION ORAL EVERY 6 HOURS PRN
Qty: 840 ML | Refills: 0 | Status: SHIPPED | OUTPATIENT
Start: 2019-07-05 | End: 2019-11-01

## 2019-07-05 RX ORDER — ALBUTEROL SULFATE 90 UG/1
2 AEROSOL, METERED RESPIRATORY (INHALATION) EVERY 6 HOURS PRN
Qty: 8.5 G | Refills: 1 | Status: SHIPPED | OUTPATIENT
Start: 2019-07-05 | End: 2023-04-28

## 2019-07-05 NOTE — PROGRESS NOTES
CC: Acute visit, cough and dizziness.    HISTORY OF PRESENT ILLNESS: Patient is a 38 y.o. male established patient who presents today to discuss her medical conditions as mentioned in HPI below.    Health Maintenance: Completed    Viral URI with cough  This is a new problem which Pt has been facing since last 2 weeks with sore throat , runny nose. Denies any fever chills. Currently having ongoing cough with phlegm. Does have stuff nose. Pt has not used cough syrup, has used Nasonex but doesn't help him much.     Dizziness  This is a new problem which patient is mentioning on and off at his workplace happened twice in the last 1 week, denies any fever at this time but is recovering from the recent URI 2 weeks back.  Patient does endorse that he does not drink enough fluids but does have some soda's at workplace.      PHQ score 0, BMI > 37 , no tobacco but does endorse passive exposure to smoke, no fall injuries.    Patient Active Problem List    Diagnosis Date Noted   • Dizziness 07/05/2019   • Seasonal allergies 07/05/2019   • Viral URI with cough 10/19/2018   • Chronic midline low back pain without sciatica 10/19/2018   • Vitamin D deficiency 10/19/2018   • Moderate episode of recurrent major depressive disorder (HCC) 02/23/2018   • Environmental allergies 02/23/2018   • Obesity (BMI 30-39.9) 02/23/2018      Allergies:Patient has no known allergies.    Current Outpatient Prescriptions   Medication Sig Dispense Refill   • loratadine (CLARITIN) 10 MG Tab Take 1 Tab by mouth every day. 90 Tab 1   • azithromycin (ZITHROMAX) 250 MG Tab Take 2 tabs on day 1 , and 1 tab remaining 4 days. 6 Tab 0   • albuterol 108 (90 Base) MCG/ACT Aero Soln inhalation aerosol Inhale 2 Puffs by mouth every 6 hours as needed for Shortness of Breath. 8.5 g 1   • Dextromethorphan-guaiFENesin (TUSSIN DM)  MG/5ML Syrup Take 10 mL by mouth every 6 hours as needed. 840 mL 0     No current facility-administered medications for this visit.   "      Social History   Substance Use Topics   • Smoking status: Never Smoker   • Smokeless tobacco: Never Used   • Alcohol use Yes      Comment: every other friday     Social History     Social History Narrative   • No narrative on file       Family History   Problem Relation Age of Onset   • Family history unknown: Yes        ROS:     - Constitutional: On and off fatigue with dizziness negative for fever, chills, unexpected weight change.    - HEENT: Seasonal allergies with recent URI ongoing rhinorrhea refusing nasal sprays as it makes it more dry and painful negative for headaches, vision changes, hearing changes, ear pain, ear discharge, sinus congestion, sore throat, and neck pain.      - Respiratory: As mentioned in HPI above negative for chest congestion, dyspnea, wheezing, and crackles.      - Cardiovascular: Negative for chest pain, palpitations, orthopnea, and bilateral lower extremity edema.     - Gastrointestinal: Negative for heartburn, nausea, vomiting, abdominal pain, hematochezia, melena, diarrhea, constipation, and greasy/foul-smelling stools.     - Genitourinary: Negative for dysuria, polyuria, hematuria, pyuria, urinary urgency, and urinary incontinence.     - Musculoskeletal: Negative for myalgias, back pain, and joint pain.     - Skin: Negative for rash, itching, cyanotic skin color change.     - Neurological: As mentioned in HPI below negative for tingling, tremors, focal sensory deficit, focal weakness and headaches.     - Endo/Heme/Allergies: Does not bruise/bleed easily.     - Psychiatric/Behavioral: Negative for depression, suicidal/homicidal ideation and memory loss.         Last lab work in February 2018 reviewed and discussed with patient.    Exam:    /74 (BP Location: Right arm, Patient Position: Sitting, BP Cuff Size: Large adult)   Pulse 72   Temp 36.6 °C (97.9 °F) (Temporal)   Ht 1.753 m (5' 9\")   Wt 115.7 kg (255 lb)   SpO2 97%  Body mass index is 37.66 kg/m².    General: "  Well nourished, well developed male in NAD  Head is grossly normal.  ENT exam : Ears benign, No sinus tenderness on palpation.  Mild pharyngeal erythema with bilateral tonsillar enlargement and jugulodigastric Cervical lymphadenopathy.  Neck: Supple without JVD or bruit. Thyroid is not enlarged.  Pulmonary: Clear to ausculation and percussion.  Normal effort. No rales, ronchi, or wheezing.  Cardiovascular: Regular rate and rhythm without murmur. Carotid and radial pulses are intact and equal bilaterally.  Extremities: no clubbing, cyanosis, or edema.    Please note that this dictation was created using voice recognition software. I have made every reasonable attempt to correct obvious errors, but I expect that there are errors of grammar and possibly content that I did not discover before finalizing the note.    Assessment/Plan:    1. Acute bronchitis, unspecified organism  New problem, given the HPI above my physical exam findings will give him empiric treatment at this time.  Never had an chest x-ray in the past, will check when at this time.  Patient understood and agreed with the plan.  - DX-CHEST-2 VIEWS; Future  - azithromycin (ZITHROMAX) 250 MG Tab; Take 2 tabs on day 1 , and 1 tab remaining 4 days.  Dispense: 6 Tab; Refill: 0  - albuterol 108 (90 Base) MCG/ACT Aero Soln inhalation aerosol; Inhale 2 Puffs by mouth every 6 hours as needed for Shortness of Breath.  Dispense: 8.5 g; Refill: 1  - Dextromethorphan-guaiFENesin (TUSSIN DM)  MG/5ML Syrup; Take 10 mL by mouth every 6 hours as needed.  Dispense: 840 mL; Refill: 0    2. Dizziness  New problem, I do not think this is related to cardiovascular etiology, rechecked his recent EKG done in April 2019 at health improvement program.  Will check blood counts, electrolytes and also HbA1c given his on and off dizziness might be related to hypoglycemia versus hyperglycemia.  Emphasized him to hydrate himself well, this is all only related to his current  bronchitis cough.  - CBC WITH DIFFERENTIAL; Future  - Comp Metabolic Panel; Future    3. Viral URI with cough  4. Seasonal allergies  Uncontrolled, given instructions to use antihistamine at this time.  Will check for eosinophilia.  Patient did not like the nasal sprays.  - loratadine (CLARITIN) 10 MG Tab; Take 1 Tab by mouth every day.  Dispense: 90 Tab; Refill: 1  - CBC WITH DIFFERENTIAL; Future    5. Vitamin D deficiency  Last vitamin D levels in February 2018 was low but patient did not take any of his high-dose vitamin D prescribed at that time.  Will recheck and replete again at this time, emphasized him to be compliant with medications.  - VITAMIN D,25 HYDROXY; Future    6. Class 2 obesity with body mass index (BMI) of 37.0 to 37.9 in adult, unspecified obesity type, unspecified whether serious comorbidity present  Patient has been following health improvement program couple of times but unable to keep up the appointments as he expresses that he is busy at workplace and could not make it up.    - HEMOGLOBIN A1C; Future  - Lipid Profile; Future

## 2019-07-05 NOTE — ASSESSMENT & PLAN NOTE
This is a new problem which patient is mentioning on and off at his workplace happened twice in the last 1 week, denies any fever at this time but is recovering from the recent URI 2 weeks back.  Patient does endorse that he does not drink enough fluids but does have some soda's at workplace.

## 2019-07-05 NOTE — ASSESSMENT & PLAN NOTE
This is a new problem which Pt has been facing since last 2 weeks with sore throat , runny nose. Denies any fever chills. Currently having ongoing cough with phlegm. Does have stuff nose. Pt has not used cough syrup, has used Nasonex but doesn't help him much.

## 2019-09-13 ENCOUNTER — TELEPHONE (OUTPATIENT)
Dept: PHYSICAL THERAPY | Facility: REHABILITATION | Age: 39
End: 2019-09-13

## 2019-09-13 NOTE — OP THERAPY DISCHARGE SUMMARY
Outpatient Physical Therapy  DISCHARGE SUMMARY NOTE      [facility] [address].  [address]  [place] [address]  Phone:  [phone]  Fax:  [phone]    Date of Visit: 09/13/2019    Patient: Osvaldo Shaikh  YOB: 1980  MRN: [id number]     Referring Provider:  Geetha Velasquez M.D.    Referring Diagnosis  M54.5, G89.29        Physical Therapy Occurrence Codes    Date of onset of impairment:  2/27/19   Date physical therapy care plan established or reviewed:  3/29/19   Date physical therapy treatment started:  3/29/19          Functional Assessment Used        Your patient is being discharged from Physical Therapy with the following comments:   · Patient has been non-compliant with physical therapy plan of care    Patient seen for evaluation only. No contact from patient since evaluation.  Discharging patient from therapy at this time.    Jimmy Marquez, PT, DPT, OCS    Date: 9/13/2019

## 2019-11-01 ENCOUNTER — OFFICE VISIT (OUTPATIENT)
Dept: MEDICAL GROUP | Facility: PHYSICIAN GROUP | Age: 39
End: 2019-11-01
Payer: COMMERCIAL

## 2019-11-01 VITALS
SYSTOLIC BLOOD PRESSURE: 110 MMHG | HEART RATE: 80 BPM | TEMPERATURE: 98.5 F | HEIGHT: 69 IN | WEIGHT: 256 LBS | BODY MASS INDEX: 37.92 KG/M2 | DIASTOLIC BLOOD PRESSURE: 70 MMHG | OXYGEN SATURATION: 94 %

## 2019-11-01 DIAGNOSIS — Z23 NEED FOR VACCINATION: ICD-10-CM

## 2019-11-01 DIAGNOSIS — M54.50 CHRONIC MIDLINE LOW BACK PAIN WITHOUT SCIATICA: ICD-10-CM

## 2019-11-01 DIAGNOSIS — J30.2 SEASONAL ALLERGIES: ICD-10-CM

## 2019-11-01 DIAGNOSIS — E66.9 CLASS 2 OBESITY WITH BODY MASS INDEX (BMI) OF 37.0 TO 37.9 IN ADULT, UNSPECIFIED OBESITY TYPE, UNSPECIFIED WHETHER SERIOUS COMORBIDITY PRESENT: ICD-10-CM

## 2019-11-01 DIAGNOSIS — E66.9 OBESITY (BMI 30-39.9): ICD-10-CM

## 2019-11-01 DIAGNOSIS — G89.29 CHRONIC MIDLINE LOW BACK PAIN WITHOUT SCIATICA: ICD-10-CM

## 2019-11-01 PROBLEM — J06.9 VIRAL URI WITH COUGH: Status: RESOLVED | Noted: 2018-10-19 | Resolved: 2019-11-01

## 2019-11-01 PROCEDURE — 99214 OFFICE O/P EST MOD 30 MIN: CPT | Mod: 25 | Performed by: INTERNAL MEDICINE

## 2019-11-01 PROCEDURE — 90471 IMMUNIZATION ADMIN: CPT | Performed by: INTERNAL MEDICINE

## 2019-11-01 PROCEDURE — 90686 IIV4 VACC NO PRSV 0.5 ML IM: CPT | Performed by: INTERNAL MEDICINE

## 2019-11-01 RX ORDER — LIDOCAINE 4 G/G
1 PATCH TOPICAL
Qty: 10 PATCH | Refills: 2 | Status: SHIPPED | OUTPATIENT
Start: 2019-11-01 | End: 2019-12-05

## 2019-11-01 RX ORDER — IBUPROFEN 800 MG/1
800 TABLET ORAL EVERY 8 HOURS PRN
Qty: 30 TAB | Refills: 1 | Status: SHIPPED | OUTPATIENT
Start: 2019-11-01 | End: 2019-12-05 | Stop reason: SDUPTHER

## 2019-11-01 RX ORDER — CYCLOBENZAPRINE HCL 10 MG
10 TABLET ORAL 3 TIMES DAILY PRN
Qty: 30 TAB | Refills: 0 | Status: SHIPPED | OUTPATIENT
Start: 2019-11-01 | End: 2023-04-28

## 2019-11-01 NOTE — ASSESSMENT & PLAN NOTE
This is a chronic health problem that is well controlled with current medications and lifestyle measures. Currently on Claritin 10 mg daily and PRN ALbuterol for flare ups.

## 2019-11-01 NOTE — PROGRESS NOTES
CC: Follow up visit, back pain.    HISTORY OF PRESENT ILLNESS: Patient is a 38 y.o. male established patient who presents today to discuss on medical conditions as mentioned in HPI below.    Health Maintenance: Due for Flu vaccine as mentioned in HPI below.    Chronic midline low back pain without sciatica  This is a chronic health problem that is uncontrolled with current medications and lifestyle measures. Pt has been noncompliant with PT and has ongoing pain. PT has dropped a note that they discharged the patient as he was not compliant with PT.    Seasonal allergies  This is a chronic health problem that is well controlled with current medications and lifestyle measures. Currently on Claritin 10 mg daily and PRN ALbuterol for flare ups.    Obesity (BMI 30-39.9)  This is a chronic health problem that is uncontrolled with current medications and lifestyle measures. Pt requesting for weight loss, last HIP referral was noncompliant with the program.      PHQ score 0, BMI > 37 , no tobacco, no fall injuries.    Patient Active Problem List    Diagnosis Date Noted   • Dizziness 07/05/2019   • Seasonal allergies 07/05/2019   • Chronic midline low back pain without sciatica 10/19/2018   • Vitamin D deficiency 10/19/2018   • Moderate episode of recurrent major depressive disorder (HCC) 02/23/2018   • Environmental allergies 02/23/2018   • Obesity (BMI 30-39.9) 02/23/2018      Allergies:Patient has no known allergies.    Current Outpatient Medications   Medication Sig Dispense Refill   • cyclobenzaprine (FLEXERIL) 10 MG Tab Take 1 Tab by mouth 3 times a day as needed. 30 Tab 0   • ibuprofen (MOTRIN) 800 MG Tab Take 1 Tab by mouth every 8 hours as needed. 30 Tab 1   • Lidocaine 4 % Patch 1 Patch by Apply externally route 1 time daily as needed. 10 Patch 2   • loratadine (CLARITIN) 10 MG Tab Take 1 Tab by mouth every day. 90 Tab 1   • albuterol 108 (90 Base) MCG/ACT Aero Soln inhalation aerosol Inhale 2 Puffs by mouth every  6 hours as needed for Shortness of Breath. 8.5 g 1     No current facility-administered medications for this visit.        Social History     Tobacco Use   • Smoking status: Never Smoker   • Smokeless tobacco: Never Used   Substance Use Topics   • Alcohol use: Yes     Comment: occ   • Drug use: No     Social History     Social History Narrative   • Not on file       Family History   Problem Relation Age of Onset   • No Known Problems Mother    • No Known Problems Father    • No Known Problems Sister    • No Known Problems Maternal Grandmother    • No Known Problems Maternal Grandfather    • No Known Problems Paternal Grandmother    • No Known Problems Paternal Grandfather         ROS:     - Constitutional:  Negative for fever, chills, unexpected weight change, and fatigue/generalized weakness.    - HEENT:  Negative for headaches, vision changes, hearing changes, ear pain, ear discharge, rhinorrhea, sinus congestion, sore throat, and neck pain.      - Respiratory: Negative for cough, sputum production, chest congestion, dyspnea, wheezing, and crackles.      - Cardiovascular: Negative for chest pain, palpitations, orthopnea, and bilateral lower extremity edema.     - Gastrointestinal: Negative for heartburn, nausea, vomiting, abdominal pain, hematochezia, melena, diarrhea, constipation, and greasy/foul-smelling stools.     - Genitourinary: Negative for dysuria, polyuria, hematuria, pyuria, urinary urgency, and urinary incontinence.     - Musculoskeletal:As mentioned HPI above Negative for myalgias and joint pain.     - Skin: Negative for rash, itching, cyanotic skin color change.     - Neurological: Negative for dizziness, tingling, tremors, focal sensory deficit, focal weakness and headaches.     - Endo/Heme/Allergies: Does not bruise/bleed easily.     - Psychiatric/Behavioral: Negative for depression, suicidal/homicidal ideation and memory loss.       Last lab work in 02/2018 reviewed and discussed with the  "patient.      Exam:    /70 (BP Location: Left arm, Patient Position: Sitting, BP Cuff Size: Adult)   Pulse 80   Temp 36.9 °C (98.5 °F) (Temporal)   Ht 1.753 m (5' 9\")   Wt 116.1 kg (256 lb)   SpO2 94%  Body mass index is 37.8 kg/m².    General:  Well nourished, well developed male in NAD  Head is grossly normal.  Neck: Supple without JVD or bruit. Thyroid is not enlarged.  Pulmonary: Clear to ausculation and percussion.  Normal effort. No rales, ronchi, or wheezing.  Cardiovascular: Regular rate and rhythm without murmur. Carotid and radial pulses are intact and equal bilaterally.  Extremities: no clubbing, cyanosis, or edema.  Back Exam : Positive for mild tenderness on palpation of L4-L5 lumbar spines, no paraspinal tenderness, straight leg raising test done positive for 45 degrees bilaterally which patient says is due to muscle stiffness but no pain on the back.    Please note that this dictation was created using voice recognition software. I have made every reasonable attempt to correct obvious errors, but I expect that there are errors of grammar and possibly content that I did not discover before finalizing the note.    Assessment/Plan:  1. Chronic midline low back pain without sciatica  Uncontrolled, emphasized on taking the muscle relaxer and ibuprofen as needed.  As patient being noncompliant with physical therapy referral given and was discharged from physical therapy for the same reason, I have provided him exercises on his after visit summary and emphasized to make it at least for 4 weeks and let me know so that we can plan for an MRI at that time.  Patient understood and agreed with the plan.  Reviewed the last lumbar spine x-rays were within normal limits.  - ibuprofen (MOTRIN) 800 MG Tab; Take 1 Tab by mouth every 8 hours as needed.  Dispense: 30 Tab; Refill: 1  - Lidocaine 4 % Patch; 1 Patch by Apply externally route 1 time daily as needed.  Dispense: 10 Patch; Refill: 2  - " cyclobenzaprine (FLEXERIL) 10 MG Tab; Take 1 Tab by mouth 3 times a day as needed.  Dispense: 30 Tab; Refill: 0    2. Seasonal allergies  Well-controlled, continue current Claritin.    3. Need for vaccination  - Influenza Vaccine Quad Injection (PF)    4. Obesity (BMI 30-39.9)  5. Class 2 obesity with body mass index (BMI) of 37.0 to 37.9 in adult, unspecified obesity type, unspecified whether serious comorbidity present  Uncontrolled, patient has been previously on pharmacological therapy for weight loss but trying diet management at this time.  He will let us know if he wants to resume back to it.  Emphasized on complications of weight gain.

## 2019-11-01 NOTE — PATIENT INSTRUCTIONS
"Low Back Sprain Rehab  Ask your health care provider which exercises are safe for you. Do exercises exactly as told by your health care provider and adjust them as directed. It is normal to feel mild stretching, pulling, tightness, or discomfort as you do these exercises, but you should stop right away if you feel sudden pain or your pain gets worse. Do not begin these exercises until told by your health care provider.  Stretching and range of motion exercises  These exercises warm up your muscles and joints and improve the movement and flexibility of your back. These exercises also help to relieve pain, numbness, and tingling.  Exercise A: Lumbar rotation  1. Lie on your back on a firm surface and bend your knees.  2. Straighten your arms out to your sides so each arm forms an \"L\" shape with a side of your body (a 90 degree angle).  3. Slowly move both of your knees to one side of your body until you feel a stretch in your lower back. Try not to let your shoulders move off of the floor.  4. Hold for __________ seconds.  5. Tense your abdominal muscles and slowly move your knees back to the starting position.  6. Repeat this exercise on the other side of your body.  Repeat __________ times. Complete this exercise __________ times a day.  Exercise B: Prone extension on elbows  1. Lie on your abdomen on a firm surface.  2. Prop yourself up on your elbows.  3. Use your arms to help lift your chest up until you feel a gentle stretch in your abdomen and your lower back.  ¨ This will place some of your body weight on your elbows. If this is uncomfortable, try stacking pillows under your chest.  ¨ Your hips should stay down, against the surface that you are lying on. Keep your hip and back muscles relaxed.  4. Hold for __________ seconds.  5. Slowly relax your upper body and return to the starting position.  Repeat __________ times. Complete this exercise __________ times a day.  Strengthening exercises  These exercises " build strength and endurance in your back. Endurance is the ability to use your muscles for a long time, even after they get tired.  Exercise C: Pelvic tilt  1. Lie on your back on a firm surface. Bend your knees and keep your feet flat.  2. Tense your abdominal muscles. Tip your pelvis up toward the ceiling and flatten your lower back into the floor.  ¨ To help with this exercise, you may place a small towel under your lower back and try to push your back into the towel.  3. Hold for __________ seconds.  4. Let your muscles relax completely before you repeat this exercise.  Repeat __________ times. Complete this exercise __________ times a day.  Exercise D: Alternating arm and leg raises  1. Get on your hands and knees on a firm surface. If you are on a hard floor, you may want to use padding to cushion your knees, such as an exercise mat.  2. Line up your arms and legs. Your hands should be below your shoulders, and your knees should be below your hips.  3. Lift your left leg behind you. At the same time, raise your right arm and straighten it in front of you.  ¨ Do not lift your leg higher than your hip.  ¨ Do not lift your arm higher than your shoulder.  ¨ Keep your abdominal and back muscles tight.  ¨ Keep your hips facing the ground.  ¨ Do not arch your back.  ¨ Keep your balance carefully, and do not hold your breath.  4. Hold for __________ seconds.  5. Slowly return to the starting position and repeat with your right leg and your left arm.  Repeat __________ times. Complete this exercise __________ times a day.  Exercise E: Abdominal set with straight leg raise  1. Lie on your back on a firm surface.  2. Bend one of your knees and keep your other leg straight.  3. Tense your abdominal muscles and lift your straight leg up, 4-6 inches (10-15 cm) off the ground.  4. Keep your abdominal muscles tight and hold for __________ seconds.  ¨ Do not hold your breath.  ¨ Do not arch your back. Keep it flat against the  ground.  5. Keep your abdominal muscles tense as you slowly lower your leg back to the starting position.  6. Repeat with your other leg.  Repeat __________ times. Complete this exercise __________ times a day.  Posture and body mechanics     Body mechanics refers to the movements and positions of your body while you do your daily activities. Posture is part of body mechanics. Good posture and healthy body mechanics can help to relieve stress in your body's tissues and joints. Good posture means that your spine is in its natural S-curve position (your spine is neutral), your shoulders are pulled back slightly, and your head is not tipped forward. The following are general guidelines for applying improved posture and body mechanics to your everyday activities.  Standing    · When standing, keep your spine neutral and your feet about hip-width apart. Keep a slight bend in your knees. Your ears, shoulders, and hips should line up.  · When you do a task in which you  one place for a long time, place one foot up on a stable object that is 2-4 inches (5-10 cm) high, such as a footstool. This helps keep your spine neutral.  Sitting  · When sitting, keep your spine neutral and keep your feet flat on the floor. Use a footrest, if necessary, and keep your thighs parallel to the floor. Avoid rounding your shoulders, and avoid tilting your head forward.  · When working at a desk or a computer, keep your desk at a height where your hands are slightly lower than your elbows. Slide your chair under your desk so you are close enough to maintain good posture.  · When working at a computer, place your monitor at a height where you are looking straight ahead and you do not have to tilt your head forward or downward to look at the screen.  Resting    · When lying down and resting, avoid positions that are most painful for you.  · If you have pain with activities such as sitting, bending, stooping, or squatting (flexion-based  activities), lie in a position in which your body does not bend very much. For example, avoid curling up on your side with your arms and knees near your chest (fetal position).  · If you have pain with activities such as standing for a long time or reaching with your arms (extension-based activities), lie with your spine in a neutral position and bend your knees slightly. Try the following positions:  · Lying on your side with a pillow between your knees.  · Lying on your back with a pillow under your knees.  Lifting    · When lifting objects, keep your feet at least shoulder-width apart and tighten your abdominal muscles.  · Bend your knees and hips and keep your spine neutral. It is important to lift using the strength of your legs, not your back. Do not lock your knees straight out.  · Always ask for help to lift heavy or awkward objects.  This information is not intended to replace advice given to you by your health care provider. Make sure you discuss any questions you have with your health care provider.  Document Released: 12/18/2006 Document Revised: 08/24/2017 Document Reviewed: 09/28/2016  [employer] Interactive Patient Education © 2017 [employer]

## 2019-11-01 NOTE — ASSESSMENT & PLAN NOTE
This is a chronic health problem that is uncontrolled with current medications and lifestyle measures. Pt requesting for weight loss, last HIP referral was noncompliant with the program.

## 2019-11-01 NOTE — ASSESSMENT & PLAN NOTE
This is a chronic health problem that is uncontrolled with current medications and lifestyle measures. Pt has been noncompliant with PT and has ongoing pain. PT has dropped a note that they discharged the patient as he was not compliant with PT.

## 2019-11-04 DIAGNOSIS — J30.2 SEASONAL ALLERGIES: ICD-10-CM

## 2019-11-04 RX ORDER — LORATADINE 10 MG/1
10 TABLET ORAL DAILY
Qty: 90 TAB | Refills: 3 | Status: SHIPPED | OUTPATIENT
Start: 2019-11-04 | End: 2023-04-28

## 2019-11-04 NOTE — TELEPHONE ENCOUNTER
Was the patient seen in the last year in this department? Yes    Does patient have an active prescription for medications requested? No     Received Request Via: Pharmacy      Pt met protocol?: Yes, ov 11/1/19

## 2019-12-05 ENCOUNTER — OFFICE VISIT (OUTPATIENT)
Dept: MEDICAL GROUP | Facility: PHYSICIAN GROUP | Age: 39
End: 2019-12-05
Payer: COMMERCIAL

## 2019-12-05 VITALS
SYSTOLIC BLOOD PRESSURE: 108 MMHG | HEART RATE: 79 BPM | TEMPERATURE: 97.6 F | WEIGHT: 260 LBS | HEIGHT: 69 IN | OXYGEN SATURATION: 94 % | BODY MASS INDEX: 38.51 KG/M2 | DIASTOLIC BLOOD PRESSURE: 66 MMHG

## 2019-12-05 DIAGNOSIS — M25.561 ACUTE PAIN OF RIGHT KNEE: ICD-10-CM

## 2019-12-05 DIAGNOSIS — E66.9 CLASS 2 OBESITY WITH BODY MASS INDEX (BMI) OF 38.0 TO 38.9 IN ADULT, UNSPECIFIED OBESITY TYPE, UNSPECIFIED WHETHER SERIOUS COMORBIDITY PRESENT: ICD-10-CM

## 2019-12-05 DIAGNOSIS — E66.9 OBESITY (BMI 30-39.9): ICD-10-CM

## 2019-12-05 PROCEDURE — 99214 OFFICE O/P EST MOD 30 MIN: CPT | Performed by: INTERNAL MEDICINE

## 2019-12-05 RX ORDER — IBUPROFEN 800 MG/1
800 TABLET ORAL EVERY 8 HOURS PRN
Qty: 30 TAB | Refills: 1 | Status: SHIPPED | OUTPATIENT
Start: 2019-12-05

## 2019-12-05 NOTE — ASSESSMENT & PLAN NOTE
This is a new problem which started since last 2-3 days after patient started doing work out in gym and worsened at work as he was running and got this pain. Severity of the pain is 7/10. Denies any fever, chills, swelling of the knee.

## 2019-12-05 NOTE — PROGRESS NOTES
CC: Acute visit, knee pain.    HISTORY OF PRESENT ILLNESS: Patient is a 38 y.o. male established patient who presents today to discuss on medical conditions as mentioned in HPI below.    Health Maintenance: Completed    Acute pain of right knee  This is a new problem which started since last 2-3 days after patient started doing work out in gym and worsened at work as he was running and got this pain. Severity of the pain is 7/10. Denies any fever, chills, swelling of the knee.      PHQ score 0, BMI within normal limits, no tobacco, no fall injuries.    Patient Active Problem List    Diagnosis Date Noted   • Acute pain of right knee 12/05/2019   • Dizziness 07/05/2019   • Seasonal allergies 07/05/2019   • Chronic midline low back pain without sciatica 10/19/2018   • Vitamin D deficiency 10/19/2018   • Moderate episode of recurrent major depressive disorder (HCC) 02/23/2018   • Environmental allergies 02/23/2018   • Obesity (BMI 30-39.9) 02/23/2018      Allergies:Patient has no known allergies.    Current Outpatient Medications   Medication Sig Dispense Refill   • ibuprofen (MOTRIN) 800 MG Tab Take 1 Tab by mouth every 8 hours as needed. 30 Tab 1   • loratadine (CLARITIN) 10 MG Tab Take 1 Tab by mouth every day. 90 Tab 3   • cyclobenzaprine (FLEXERIL) 10 MG Tab Take 1 Tab by mouth 3 times a day as needed. 30 Tab 0   • albuterol 108 (90 Base) MCG/ACT Aero Soln inhalation aerosol Inhale 2 Puffs by mouth every 6 hours as needed for Shortness of Breath. 8.5 g 1     No current facility-administered medications for this visit.        Social History     Tobacco Use   • Smoking status: Never Smoker   • Smokeless tobacco: Never Used   Substance Use Topics   • Alcohol use: Yes     Comment: occ   • Drug use: No     Social History     Patient does not qualify to have social determinant information on file (likely too young).   Social History Narrative   • Not on file       Family History   Problem Relation Age of Onset   • No  "Known Problems Mother    • No Known Problems Father    • No Known Problems Sister    • No Known Problems Maternal Grandmother    • No Known Problems Maternal Grandfather    • No Known Problems Paternal Grandmother    • No Known Problems Paternal Grandfather         ROS:     - Constitutional:  Negative for fever, chills, unexpected weight change, and fatigue/generalized weakness.    - HEENT:  Negative for headaches, vision changes, hearing changes, ear pain, ear discharge, rhinorrhea, sinus congestion, sore throat, and neck pain.      - Respiratory: Negative for cough, sputum production, chest congestion, dyspnea, wheezing, and crackles.      - Cardiovascular: Negative for chest pain, palpitations, orthopnea, and bilateral lower extremity edema.     - Gastrointestinal: Negative for heartburn, nausea, vomiting, abdominal pain, hematochezia, melena, diarrhea, constipation, and greasy/foul-smelling stools.     - Genitourinary: Negative for dysuria, polyuria, hematuria, pyuria, urinary urgency, and urinary incontinence.     - Musculoskeletal:As mentioned in HPI above  Negative for myalgias, back pain.     - Skin: Negative for rash, itching, cyanotic skin color change.     - Neurological: Negative for dizziness, tingling, tremors, focal sensory deficit, focal weakness and headaches.     - Endo/Heme/Allergies: Does not bruise/bleed easily.     - Psychiatric/Behavioral: Negative for depression, suicidal/homicidal ideation and memory loss.      Last lab work in 2018 reviewed and discussed with the patient.    Exam:    /66 (BP Location: Right arm, Patient Position: Sitting, BP Cuff Size: Large adult)   Pulse 79   Temp 36.4 °C (97.6 °F) (Temporal)   Ht 1.753 m (5' 9\")   Wt 117.9 kg (260 lb)   SpO2 94%  Body mass index is 38.4 kg/m².    General:  Well nourished, well developed male in NAD  Head is grossly normal.  Neck: Supple without JVD or bruit. Thyroid is not enlarged.  Pulmonary: Clear to ausculation and " percussion.  Normal effort. No rales, ronchi, or wheezing.  Cardiovascular: Regular rate and rhythm without murmur. Carotid and radial pulses are intact and equal bilaterally.  Extremities: no clubbing, cyanosis, or edema.  Rt Knee exam : negative anterior drawer, negative posterior drawer, negative lachmann,  no joint line tenderness, negative Thessalay test, normal gait, no asymetry of quadriceps,   negative valgus and varus stress,  negative Olena, Negative Pivot, negative Anayeli test.    Please note that this dictation was created using voice recognition software. I have made every reasonable attempt to correct obvious errors, but I expect that there are errors of grammar and possibly content that I did not discover before finalizing the note.    Assessment/Plan:  1. Acute pain of right knee  New problem, as mentioned in HPI above my physical exam findings this looks most likely patellofemoral syndrome versus ligament strain around the patella.  I do not think he has anything inside the joint and denies any history of trauma so no need of an x-ray at this time.  Strong anti-inflammatory course along with physical therapy should help him, will give him a knee brace for relief of symptoms at this time.  - ibuprofen (MOTRIN) 800 MG Tab; Take 1 Tab by mouth every 8 hours as needed.  Dispense: 30 Tab; Refill: 1  - REFERRAL TO PHYSICAL THERAPY Reason for Therapy: Eval/Treat/Report    2. Obesity (BMI 30-39.9)  3. Class 2 obesity with body mass index (BMI) of 38.0 to 38.9 in adult, unspecified obesity type, unspecified whether serious comorbidity present  Patient identified as elevated BMI, appropriate counseling given.

## 2020-01-06 NOTE — TELEPHONE ENCOUNTER
Future Appointments       Provider Department [place]    10/19/2018 2:40 PM Geetha Velasquez M.D. [facility]    12/14/2018 11:00 AM Geetha Velasquez M.D. [facility]        ESTABLISHED PATIENT PRE-VISIT PLANNING     Note: Patient will not be contacted if there is no indication to call.     1.  Reviewed notes from the last few office visits within the medical group: Yes    2.  If any orders were placed at last visit or intended to be done for this visit (i.e. 6 mos follow-up), do we have Results/Consult Notes?        •  Labs - Labs were not ordered at last office visit.       •  Imaging - Imaging was not ordered at last office visit.       •  Referrals - No referrals were ordered at last office visit.    3. Is this appointment scheduled as a Hospital Follow-Up? No    4.  Immunizations were updated in Epic using WebIZ?: No WebIZ record       •  Web Iz Recommendations: FLU and TDAP    5.  Patient is due for the following Health Maintenance Topics:   Health Maintenance Due   Topic Date Due   • IMM DTaP/Tdap/Td Vaccine (1 - Tdap) 12/16/1999   • IMM INFLUENZA (1) 09/01/2018       6.  MDX printed for Provider? NO    7.  Patient was informed to arrive 15 min prior to their scheduled appointment and bring in their medication bottles.   Transport

## 2020-07-23 ENCOUNTER — APPOINTMENT (OUTPATIENT)
Dept: MEDICAL GROUP | Facility: PHYSICIAN GROUP | Age: 40
End: 2020-07-23
Payer: COMMERCIAL

## 2021-05-12 ENCOUNTER — OFFICE VISIT (OUTPATIENT)
Dept: MEDICAL GROUP | Facility: LAB | Age: 41
End: 2021-05-12
Payer: COMMERCIAL

## 2021-05-12 VITALS
HEART RATE: 96 BPM | DIASTOLIC BLOOD PRESSURE: 84 MMHG | RESPIRATION RATE: 13 BRPM | TEMPERATURE: 98.4 F | OXYGEN SATURATION: 97 % | WEIGHT: 250 LBS | SYSTOLIC BLOOD PRESSURE: 108 MMHG | HEIGHT: 69 IN | BODY MASS INDEX: 37.03 KG/M2

## 2021-05-12 DIAGNOSIS — Z76.89 ENCOUNTER TO ESTABLISH CARE: ICD-10-CM

## 2021-05-12 DIAGNOSIS — Z13.6 SCREENING FOR CARDIOVASCULAR CONDITION: ICD-10-CM

## 2021-05-12 DIAGNOSIS — G89.29 CHRONIC MIDLINE LOW BACK PAIN WITHOUT SCIATICA: ICD-10-CM

## 2021-05-12 DIAGNOSIS — R03.0 ELEVATED BLOOD PRESSURE READING: ICD-10-CM

## 2021-05-12 DIAGNOSIS — M54.50 CHRONIC MIDLINE LOW BACK PAIN WITHOUT SCIATICA: ICD-10-CM

## 2021-05-12 DIAGNOSIS — R53.83 FATIGUE, UNSPECIFIED TYPE: ICD-10-CM

## 2021-05-12 PROCEDURE — 99214 OFFICE O/P EST MOD 30 MIN: CPT | Performed by: FAMILY MEDICINE

## 2021-05-12 ASSESSMENT — PATIENT HEALTH QUESTIONNAIRE - PHQ9
SUM OF ALL RESPONSES TO PHQ9 QUESTIONS 1 AND 2: 2
7. TROUBLE CONCENTRATING ON THINGS, SUCH AS READING THE NEWSPAPER OR WATCHING TELEVISION: NOT AT ALL
9. THOUGHTS THAT YOU WOULD BE BETTER OFF DEAD, OR OF HURTING YOURSELF: NOT AT ALL
5. POOR APPETITE OR OVEREATING: SEVERAL DAYS
2. FEELING DOWN, DEPRESSED, IRRITABLE, OR HOPELESS: SEVERAL DAYS
4. FEELING TIRED OR HAVING LITTLE ENERGY: MORE THAN HALF THE DAYS
3. TROUBLE FALLING OR STAYING ASLEEP OR SLEEPING TOO MUCH: NOT AT ALL
8. MOVING OR SPEAKING SO SLOWLY THAT OTHER PEOPLE COULD HAVE NOTICED. OR THE OPPOSITE, BEING SO FIGETY OR RESTLESS THAT YOU HAVE BEEN MOVING AROUND A LOT MORE THAN USUAL: NOT AT ALL
6. FEELING BAD ABOUT YOURSELF - OR THAT YOU ARE A FAILURE OR HAVE LET YOURSELF OR YOUR FAMILY DOWN: SEVERAL DAYS
SUM OF ALL RESPONSES TO PHQ QUESTIONS 1-9: 6
1. LITTLE INTEREST OR PLEASURE IN DOING THINGS: SEVERAL DAYS

## 2021-05-12 NOTE — LETTER
May 12, 2021        Osvaldo Ramos Neel  [address]  [place] [address]        To whom it may concern:    Osvaldo is a patient of mine and under my care. We are monitoring his blood pressure and he is cleared to return to the dentist.       If you have any questions or concerns, please don't hesitate to call.        Sincerely,        Hussain Muhammad M.D.

## 2021-05-12 NOTE — LETTER
May 12, 2021        Osvaldo Shaikh  [address]  [place] [address]        Dear Osvaldo:    ***    If you have any questions or concerns, please don't hesitate to call.        Sincerely,        Hussain Muhammad M.D.    Electronically Signed
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
## 2021-05-14 ASSESSMENT — ENCOUNTER SYMPTOMS
PALPITATIONS: 0
FOCAL WEAKNESS: 0
SENSORY CHANGE: 0
SHORTNESS OF BREATH: 0
CHILLS: 0
BLURRED VISION: 0
TINGLING: 0
WEIGHT LOSS: 0
TREMORS: 0
WHEEZING: 0
FEVER: 0
BACK PAIN: 1

## 2021-11-12 ENCOUNTER — APPOINTMENT (OUTPATIENT)
Dept: MEDICAL GROUP | Facility: LAB | Age: 41
End: 2021-11-12
Payer: COMMERCIAL

## 2023-04-28 ENCOUNTER — OFFICE VISIT (OUTPATIENT)
Dept: MEDICAL GROUP | Facility: PHYSICIAN GROUP | Age: 43
End: 2023-04-28
Payer: COMMERCIAL

## 2023-04-28 VITALS
BODY MASS INDEX: 39.61 KG/M2 | SYSTOLIC BLOOD PRESSURE: 134 MMHG | DIASTOLIC BLOOD PRESSURE: 96 MMHG | TEMPERATURE: 98.2 F | HEART RATE: 89 BPM | WEIGHT: 268.2 LBS | OXYGEN SATURATION: 94 %

## 2023-04-28 DIAGNOSIS — E66.9 OBESITY (BMI 30-39.9): ICD-10-CM

## 2023-04-28 DIAGNOSIS — Z11.59 ENCOUNTER FOR HEPATITIS C SCREENING TEST FOR LOW RISK PATIENT: ICD-10-CM

## 2023-04-28 DIAGNOSIS — J02.9 PHARYNGITIS, UNSPECIFIED ETIOLOGY: ICD-10-CM

## 2023-04-28 DIAGNOSIS — Z23 NEED FOR VACCINATION: ICD-10-CM

## 2023-04-28 DIAGNOSIS — G89.29 CHRONIC MIDLINE LOW BACK PAIN WITHOUT SCIATICA: ICD-10-CM

## 2023-04-28 DIAGNOSIS — R03.0 ELEVATED BLOOD PRESSURE READING: ICD-10-CM

## 2023-04-28 DIAGNOSIS — Z00.00 PREVENTATIVE HEALTH CARE: ICD-10-CM

## 2023-04-28 DIAGNOSIS — M54.50 CHRONIC MIDLINE LOW BACK PAIN WITHOUT SCIATICA: ICD-10-CM

## 2023-04-28 DIAGNOSIS — J30.2 SEASONAL ALLERGIES: ICD-10-CM

## 2023-04-28 DIAGNOSIS — Z00.00 ENCOUNTER FOR MEDICAL EXAMINATION TO ESTABLISH CARE: ICD-10-CM

## 2023-04-28 PROCEDURE — 90746 HEPB VACCINE 3 DOSE ADULT IM: CPT

## 2023-04-28 PROCEDURE — 90471 IMMUNIZATION ADMIN: CPT

## 2023-04-28 PROCEDURE — 99214 OFFICE O/P EST MOD 30 MIN: CPT | Mod: 25

## 2023-04-28 RX ORDER — MELOXICAM 7.5 MG/1
7.5 TABLET ORAL DAILY
Qty: 90 TABLET | Refills: 0 | Status: SHIPPED | OUTPATIENT
Start: 2023-04-28

## 2023-04-28 RX ORDER — CETIRIZINE HYDROCHLORIDE 10 MG/1
10 TABLET ORAL DAILY
Qty: 90 TABLET | Refills: 0 | Status: SHIPPED | OUTPATIENT
Start: 2023-04-28 | End: 2023-08-14

## 2023-04-28 RX ORDER — LIDOCAINE HYDROCHLORIDE 20 MG/ML
5-15 SOLUTION OROPHARYNGEAL
Qty: 120 ML | Refills: 0 | Status: SHIPPED | OUTPATIENT
Start: 2023-04-28
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
## 2023-04-28 NOTE — ASSESSMENT & PLAN NOTE
Chronic, worsening  Body mass index is 39.61 kg/m².  He has worked with a dietician in the past.  Will obtain labs and assess if medications are indicated for weight loss.

## 2023-07-25 ENCOUNTER — APPOINTMENT (OUTPATIENT)
Dept: PHYSICAL THERAPY | Facility: REHABILITATION | Age: 43
End: 2023-07-25
Payer: COMMERCIAL

## 2023-08-09 DIAGNOSIS — J30.2 SEASONAL ALLERGIES: ICD-10-CM

## 2023-08-14 RX ORDER — CETIRIZINE HYDROCHLORIDE 10 MG/1
10 TABLET ORAL DAILY
Qty: 90 TABLET | Refills: 0 | Status: SHIPPED | OUTPATIENT
Start: 2023-08-14

## 2023-08-31 ENCOUNTER — APPOINTMENT (OUTPATIENT)
Dept: MEDICAL GROUP | Facility: PHYSICIAN GROUP | Age: 43
End: 2023-08-31
Payer: COMMERCIAL

## 2023-10-27 ENCOUNTER — DOCUMENTATION (OUTPATIENT)
Dept: HEALTH INFORMATION MANAGEMENT | Facility: OTHER | Age: 43
End: 2023-10-27
Payer: COMMERCIAL

## 2023-10-30 ENCOUNTER — TELEPHONE (OUTPATIENT)
Dept: HEALTH INFORMATION MANAGEMENT | Facility: OTHER | Age: 43
End: 2023-10-30

## 2025-01-13 ENCOUNTER — APPOINTMENT (OUTPATIENT)
Dept: RADIOLOGY | Facility: IMAGING CENTER | Age: 45
End: 2025-01-13
Attending: NURSE PRACTITIONER
Payer: COMMERCIAL

## 2025-01-13 ENCOUNTER — OFFICE VISIT (OUTPATIENT)
Dept: URGENT CARE | Facility: PHYSICIAN GROUP | Age: 45
End: 2025-01-13
Payer: COMMERCIAL

## 2025-01-13 VITALS
WEIGHT: 280 LBS | SYSTOLIC BLOOD PRESSURE: 124 MMHG | HEART RATE: 80 BPM | RESPIRATION RATE: 20 BRPM | HEIGHT: 69 IN | DIASTOLIC BLOOD PRESSURE: 86 MMHG | TEMPERATURE: 97.5 F | BODY MASS INDEX: 41.47 KG/M2 | OXYGEN SATURATION: 98 %

## 2025-01-13 DIAGNOSIS — B34.9 VIRAL SYNDROME: ICD-10-CM

## 2025-01-13 DIAGNOSIS — M54.9 ACUTE UPPER BACK PAIN: ICD-10-CM

## 2025-01-13 LAB
FLUAV RNA SPEC QL NAA+PROBE: NEGATIVE
FLUBV RNA SPEC QL NAA+PROBE: NEGATIVE
RSV RNA SPEC QL NAA+PROBE: NEGATIVE
SARS-COV-2 RNA RESP QL NAA+PROBE: NEGATIVE

## 2025-01-13 PROCEDURE — 71046 X-RAY EXAM CHEST 2 VIEWS: CPT | Mod: TC | Performed by: RADIOLOGY

## 2025-01-13 PROCEDURE — 99213 OFFICE O/P EST LOW 20 MIN: CPT | Performed by: NURSE PRACTITIONER

## 2025-01-13 PROCEDURE — 0241U POCT CEPHEID COV-2, FLU A/B, RSV - PCR: CPT | Performed by: NURSE PRACTITIONER

## 2025-01-13 ASSESSMENT — ENCOUNTER SYMPTOMS
BACK PAIN: 1
HEADACHES: 0
FEVER: 0
ABDOMINAL PAIN: 1

## 2025-01-13 NOTE — LETTER
January 13, 2025    To Whom It May Concern:         This is confirmation that Osvaldo Shaikh attended his scheduled appointment with CHRISTOPHER Reyes on 1/13/25. It's my medical opinion that this patient would benefit from rest and recuperation for 4 days.   May return to work/school/ on 1/17/2025, or sooner if feeling better.           If you have any questions please do not hesitate to call me at the phone number listed below.    Sincerely,          MELODIE Reyes.  [phone]

## 2025-01-13 NOTE — PROGRESS NOTES
"Subjective:   Osvaldo Shaikh  is a 44 y.o. male who presents for Body Aches (Back pain,stomach pain,x1 week)       Back Pain  This is a new problem. The current episode started in the past 7 days. The problem occurs constantly. The pain is present in the thoracic spine. The pain is moderate. The pain is Worse during the night. The symptoms are aggravated by lying down, bending and coughing. Associated symptoms include abdominal pain. Pertinent negatives include no fever or headaches. Risk factors include obesity. He has tried nothing for the symptoms.   Hx of chronic low back pain. Weight gain over the last few years. Reports pain at bilateral thoracic spine. No OTC meds.  Reports feeling sob in the middle of the night. Admits to sedentary life.       Review of Systems   Constitutional:  Negative for fever.   Gastrointestinal:  Positive for abdominal pain.   Musculoskeletal:  Positive for back pain.   Neurological:  Negative for headaches.         CURRENT MEDICATIONS:  cetirizine Tabs  ibuprofen Tabs  lidocaine Soln  meloxicam Tabs  Allergies:   No Known Allergies  Current Problems: Osvaldo Shaikh does not have any pertinent problems on file.  Past Surgical Hx:  No past surgical history on file.   Past Social Hx:  reports that he has never smoked. He has never used smokeless tobacco. He reports current alcohol use. He reports that he does not use drugs.    Objective:   /86 (BP Location: Right arm, Patient Position: Sitting, BP Cuff Size: Adult long)   Pulse 80   Temp 36.4 °C (97.5 °F) (Temporal)   Resp 20   Ht 1.753 m (5' 9\")   Wt (!) 127 kg (280 lb)   SpO2 98%   BMI 41.35 kg/m²   Physical Exam  Vitals and nursing note reviewed.   Constitutional:       General: He is not in acute distress.     Appearance: Normal appearance. He is normal weight. He is not ill-appearing.   HENT:      Head: Normocephalic and atraumatic.      Right Ear: External ear normal.      Left Ear: External ear normal.      " Nose: Nose normal.      Mouth/Throat:      Mouth: Mucous membranes are moist.   Eyes:      Extraocular Movements: Extraocular movements intact.      Conjunctiva/sclera: Conjunctivae normal.      Pupils: Pupils are equal, round, and reactive to light.   Cardiovascular:      Rate and Rhythm: Normal rate and regular rhythm.      Pulses: Normal pulses.      Heart sounds: Normal heart sounds.   Pulmonary:      Effort: Pulmonary effort is normal.      Breath sounds: Normal breath sounds. No wheezing.   Abdominal:      Tenderness: There is no right CVA tenderness or left CVA tenderness.   Musculoskeletal:         General: No tenderness. Normal range of motion.      Cervical back: Normal range of motion and neck supple.   Skin:     General: Skin is warm and dry.      Findings: No rash.   Neurological:      General: No focal deficit present.      Mental Status: He is alert and oriented to person, place, and time.   Psychiatric:         Mood and Affect: Mood normal.         Behavior: Behavior normal.       Results for orders placed or performed in visit on 01/13/25   POCT CEPHEID COV-2, FLU A/B, RSV - PCR    Collection Time: 01/13/25 11:50 AM   Result Value Ref Range    SARS-CoV-2 by PCR Negative Negative, Invalid    Influenza virus A RNA Negative Negative, Invalid    Influenza virus B, PCR Negative Negative, Invalid    RSV, PCR Negative Negative, Invalid     DX-CHEST-2 VIEWS    Result Date: 1/13/2025 1/13/2025 11:24 AM HISTORY/REASON FOR EXAM:  back pain along lower rib cage TECHNIQUE/EXAM DESCRIPTION AND NUMBER OF VIEWS: Two views of the chest. COMPARISON:  None. FINDINGS: No pulmonary infiltrates or consolidations are noted. No pleural effusions, no pneumothorax are appreciated. Normal cardiopericardial silhouette.     1. No active cardiopulmonary abnormalities are identified.     Assessment/Plan:   1. Viral syndrome  - POCT CEPHEID COV-2, FLU A/B, RSV - PCR    2. Acute upper back pain  - DX-CHEST-2 VIEWS  -  EKG    44-year-old male presents with concern for upper/middle back pain.  Vital signs stable, afebrile.  He is in no acute distress does not appear ill.  His exam is negative for acute findings. Nonreproducible pain is located across the posterior bilateral lower rib cage.  No swelling, erythema crepitus or diminished lung sounds.  Chest x-ray ordered, negative for acute findings.  EKG completed.  Normal sinus rhythm rate of 70.  Possible left ventricular hypertrophy.  Discussed differential diagnosis with patient including cholelithiasis, pneumonia, pleural effusion, muscle strain, and less likely AAA.  Evaluation today is reassuring.  Recommend to follow-up with his primary care, instructed to resume meloxicam. Red flags, RTC and ER precautions discussed, with patient confirming their understanding of  need for immediate follow-up.  Discussed medication management options, risks and benefits, and alternatives to treatment plan agreed upon. Instructed to continue  medications without changes as ordered by primary care unless aforementioned above.  Patient expresses understanding and agrees to plan of care. All questions or concerns answered. For my MDM, I have personally reviewed previous notes, and test results as pertinent to today's visit.    Please note that this dictation was created using voice recognition software. I have made every reasonable attempt to correct obvious errors,  but there may be grammar errors, and possibly content that I did not discover before finalizing the note.   This note was electronically signed by CHRISTOPHER Reyes

## 2025-06-22 ENCOUNTER — OFFICE VISIT (OUTPATIENT)
Dept: URGENT CARE | Facility: PHYSICIAN GROUP | Age: 45
End: 2025-06-22
Payer: COMMERCIAL

## 2025-06-22 VITALS
SYSTOLIC BLOOD PRESSURE: 138 MMHG | WEIGHT: 270.9 LBS | TEMPERATURE: 98.2 F | DIASTOLIC BLOOD PRESSURE: 78 MMHG | RESPIRATION RATE: 20 BRPM | HEIGHT: 69 IN | OXYGEN SATURATION: 98 % | HEART RATE: 80 BPM | BODY MASS INDEX: 40.12 KG/M2

## 2025-06-22 DIAGNOSIS — H66.012 NON-RECURRENT ACUTE SUPPURATIVE OTITIS MEDIA OF LEFT EAR WITH SPONTANEOUS RUPTURE OF TYMPANIC MEMBRANE: Primary | ICD-10-CM

## 2025-06-22 PROCEDURE — 3075F SYST BP GE 130 - 139MM HG: CPT

## 2025-06-22 PROCEDURE — 3078F DIAST BP <80 MM HG: CPT

## 2025-06-22 PROCEDURE — 99213 OFFICE O/P EST LOW 20 MIN: CPT

## 2025-06-22 RX ORDER — CIPROFLOXACIN AND DEXAMETHASONE 3; 1 MG/ML; MG/ML
4 SUSPENSION/ DROPS AURICULAR (OTIC) 2 TIMES DAILY
Qty: 7.5 ML | Refills: 0 | Status: SHIPPED | OUTPATIENT
Start: 2025-06-22 | End: 2025-06-29

## 2025-06-22 NOTE — LETTER
[facility] [place]  [address]  [place] [address]     June 22, 2025    Patient: Osvaldo Shaikh   YOB: 1980   Date of Visit: 6/22/2025       To Whom It May Concern:    Osvaldo Shaikh was seen and treated in our department on 6/22/2025. Please excuse Patient from Monday thru Wednesday this week due to recent illness.     Sincerely,     MELODIE Clarke.

## 2025-06-23 ASSESSMENT — ENCOUNTER SYMPTOMS
VOMITING: 0
FEVER: 0
MYALGIAS: 0
SHORTNESS OF BREATH: 0
HEADACHES: 0
ABDOMINAL PAIN: 0
NAUSEA: 0
CHILLS: 0
SORE THROAT: 0
DIARRHEA: 0
COUGH: 0

## 2025-06-23 NOTE — PROGRESS NOTES
"Subjective:   Osvaldo Shaikh is a 44 y.o. male who presents for Otalgia (Bleeding this morning, OTC ear drop not working)      Otalgia   There is pain in the left ear. This is a new problem. The current episode started in the past 7 days (Left ear was bothersome for the past couple days. Noticed bloody drainage today). The problem has been gradually worsening. There has been no fever. Associated symptoms include ear discharge and hearing loss (Reports sounds are muffled to left ear). Pertinent negatives include no abdominal pain, coughing, diarrhea, headaches, rash, sore throat or vomiting. Associated symptoms comments: Recent viral illness with nasal congestion and sore throat. Treatments tried: Flavonoid eardrops. The treatment provided no relief. There is no history of a chronic ear infection, hearing loss or a tympanostomy tube.       Review of Systems   Constitutional:  Negative for chills, fever and malaise/fatigue.   HENT:  Positive for congestion, ear discharge, ear pain and hearing loss (Reports sounds are muffled to left ear). Negative for sore throat and tinnitus.    Respiratory:  Negative for cough and shortness of breath.    Cardiovascular:  Negative for chest pain.   Gastrointestinal:  Negative for abdominal pain, diarrhea, nausea and vomiting.   Genitourinary:  Negative for dysuria.   Musculoskeletal:  Negative for myalgias.   Skin:  Negative for rash.   Neurological:  Negative for headaches.       Medications, Allergies, and current problem list reviewed today in Epic.     Objective:     /78   Pulse 80   Temp 36.8 °C (98.2 °F) (Temporal)   Resp 20   Ht 1.753 m (5' 9\")   Wt 123 kg (270 lb 14.4 oz)   SpO2 98%     Physical Exam  Vitals and nursing note reviewed.   Constitutional:       General: He is not in acute distress.     Appearance: Normal appearance. He is not ill-appearing.   HENT:      Head: Normocephalic and atraumatic.      Right Ear: No drainage or tenderness. Tympanic " membrane is erythematous. Tympanic membrane is not perforated or bulging.      Left Ear: Decreased hearing noted. Drainage and tenderness present. Tympanic membrane is perforated and erythematous.      Ears:        Nose: Congestion present.      Mouth/Throat:      Mouth: Mucous membranes are moist.      Pharynx: Oropharynx is clear.   Eyes:      Conjunctiva/sclera: Conjunctivae normal.      Pupils: Pupils are equal, round, and reactive to light.   Cardiovascular:      Rate and Rhythm: Normal rate.      Heart sounds: Normal heart sounds.   Pulmonary:      Effort: Pulmonary effort is normal.      Breath sounds: Normal breath sounds.   Abdominal:      General: Abdomen is flat.      Palpations: Abdomen is soft.   Skin:     General: Skin is warm and dry.      Capillary Refill: Capillary refill takes less than 2 seconds.   Neurological:      Mental Status: He is alert and oriented to person, place, and time.   Psychiatric:         Mood and Affect: Mood normal.         Behavior: Behavior normal.         Assessment/Plan:       1. Non-recurrent acute suppurative otitis media of left ear with spontaneous rupture of tympanic membrane  ciprofloxacin/dexamethasone (CIPRODEX) 0.3-0.1 % Suspension        After assessment patient does have noted otitis media with spontaneous rupture of TM.  Provided Ciprodex drops.  Patient instructed to use as prescribed.  Patient instructed to avoid any further use of over-the-counter drops.  Patient instructed to take as needed analgesics for discomfort Flonase.  Patient instructed to monitor signs and symptoms of any other concerns patient was instructed return to urgent care for reevaluation.    Differential diagnosis, natural history, and supportive care discussed. We also reviewed side effects of medication including allergic response, GI upset, tendon injury, rash, sedation etc. Patient and/or guardian voices understanding.      Advised the patient to follow-up with the primary care  physician for recheck, reevaluation, and consideration of further management.    I personally reviewed prior external notes and test results pertinent to today's visit as well as additional imaging and testing completed in clinic today.     Please note that this dictation was created using voice recognition software. I have made every reasonable attempt to correct obvious errors, but I expect that there are errors of grammar and possibly content that I did not discover before finalizing the note.    This note was electronically signed by CHRISTOPHER Vitale